# Patient Record
Sex: FEMALE | Race: WHITE | NOT HISPANIC OR LATINO | Employment: UNEMPLOYED | URBAN - METROPOLITAN AREA
[De-identification: names, ages, dates, MRNs, and addresses within clinical notes are randomized per-mention and may not be internally consistent; named-entity substitution may affect disease eponyms.]

---

## 2017-12-29 ENCOUNTER — HOSPITAL ENCOUNTER (OUTPATIENT)
Dept: OTHER | Facility: HOSPITAL | Age: 32
Setting detail: SPECIMEN
Discharge: HOME | End: 2017-12-29
Attending: OBSTETRICS & GYNECOLOGY | Admitting: OBSTETRICS & GYNECOLOGY

## 2019-09-26 ENCOUNTER — OFFICE (AMBULATORY)
Dept: URBAN - METROPOLITAN AREA CLINIC 64 | Facility: CLINIC | Age: 34
End: 2019-09-26

## 2019-09-26 VITALS
HEART RATE: 69 BPM | WEIGHT: 121 LBS | SYSTOLIC BLOOD PRESSURE: 122 MMHG | HEIGHT: 62 IN | DIASTOLIC BLOOD PRESSURE: 80 MMHG

## 2019-09-26 DIAGNOSIS — K50.111 CROHN'S DISEASE OF LARGE INTESTINE WITH RECTAL BLEEDING: ICD-10-CM

## 2019-09-26 PROCEDURE — 99203 OFFICE O/P NEW LOW 30 MIN: CPT | Performed by: INTERNAL MEDICINE

## 2019-11-07 ENCOUNTER — ON CAMPUS - OUTPATIENT (AMBULATORY)
Dept: URBAN - METROPOLITAN AREA HOSPITAL 2 | Facility: HOSPITAL | Age: 34
End: 2019-11-07

## 2019-11-07 ENCOUNTER — OFFICE (AMBULATORY)
Dept: URBAN - METROPOLITAN AREA PATHOLOGY 4 | Facility: PATHOLOGY | Age: 34
End: 2019-11-07

## 2019-11-07 VITALS
HEART RATE: 86 BPM | HEART RATE: 88 BPM | DIASTOLIC BLOOD PRESSURE: 84 MMHG | DIASTOLIC BLOOD PRESSURE: 73 MMHG | HEART RATE: 78 BPM | SYSTOLIC BLOOD PRESSURE: 114 MMHG | HEART RATE: 79 BPM | RESPIRATION RATE: 16 BRPM | SYSTOLIC BLOOD PRESSURE: 126 MMHG | OXYGEN SATURATION: 100 % | DIASTOLIC BLOOD PRESSURE: 44 MMHG | HEART RATE: 93 BPM | WEIGHT: 120 LBS | DIASTOLIC BLOOD PRESSURE: 63 MMHG | SYSTOLIC BLOOD PRESSURE: 103 MMHG | SYSTOLIC BLOOD PRESSURE: 117 MMHG | DIASTOLIC BLOOD PRESSURE: 62 MMHG | HEART RATE: 51 BPM | DIASTOLIC BLOOD PRESSURE: 70 MMHG | SYSTOLIC BLOOD PRESSURE: 97 MMHG | HEART RATE: 81 BPM | RESPIRATION RATE: 18 BRPM | DIASTOLIC BLOOD PRESSURE: 58 MMHG | OXYGEN SATURATION: 99 % | SYSTOLIC BLOOD PRESSURE: 104 MMHG | SYSTOLIC BLOOD PRESSURE: 93 MMHG | TEMPERATURE: 98.3 F | SYSTOLIC BLOOD PRESSURE: 98 MMHG | HEIGHT: 62 IN

## 2019-11-07 DIAGNOSIS — K58.9 IRRITABLE BOWEL SYNDROME WITHOUT DIARRHEA: ICD-10-CM

## 2019-11-07 DIAGNOSIS — K51.90 ULCERATIVE COLITIS, UNSPECIFIED, WITHOUT COMPLICATIONS: ICD-10-CM

## 2019-11-07 DIAGNOSIS — K63.5 POLYP OF COLON: ICD-10-CM

## 2019-11-07 DIAGNOSIS — K63.3 ULCER OF INTESTINE: ICD-10-CM

## 2019-11-07 PROBLEM — D12.3 BENIGN NEOPLASM OF TRANSVERSE COLON: Status: ACTIVE | Noted: 2019-11-07

## 2019-11-07 LAB
GI HISTOLOGY: A. SELECT: (no result)
GI HISTOLOGY: B. SELECT: (no result)
GI HISTOLOGY: C. UNSPECIFIED: (no result)
GI HISTOLOGY: D. SELECT: (no result)
GI HISTOLOGY: E. SELECT: (no result)
GI HISTOLOGY: PDF REPORT: (no result)

## 2019-11-07 PROCEDURE — 88305 TISSUE EXAM BY PATHOLOGIST: CPT | Performed by: INTERNAL MEDICINE

## 2019-11-07 PROCEDURE — 45380 COLONOSCOPY AND BIOPSY: CPT | Performed by: INTERNAL MEDICINE

## 2019-11-07 RX ORDER — MESALAMINE 1.2 G/1
2.4 TABLET, DELAYED RELEASE ORAL
Qty: 180 | Refills: 3 | Status: ACTIVE
Start: 2019-11-07

## 2019-11-07 RX ORDER — MESALAMINE 1000 MG/1
1 SUPPOSITORY RECTAL
Qty: 21 | Refills: 3 | Status: ACTIVE
Start: 2019-11-07

## 2021-10-01 ENCOUNTER — APPOINTMENT (OUTPATIENT)
Dept: GENERAL RADIOLOGY | Facility: HOSPITAL | Age: 36
End: 2021-10-01

## 2021-10-01 ENCOUNTER — APPOINTMENT (OUTPATIENT)
Dept: CT IMAGING | Facility: HOSPITAL | Age: 36
End: 2021-10-01

## 2021-10-01 ENCOUNTER — HOSPITAL ENCOUNTER (OUTPATIENT)
Facility: HOSPITAL | Age: 36
Setting detail: OBSERVATION
Discharge: HOME | End: 2021-10-02
Attending: EMERGENCY MEDICINE | Admitting: EMERGENCY MEDICINE

## 2021-10-01 DIAGNOSIS — R07.89 CHEST PRESSURE: Primary | ICD-10-CM

## 2021-10-01 LAB
ALBUMIN SERPL-MCNC: 4.3 G/DL (ref 3.5–5.2)
ALBUMIN/GLOB SERPL: 1.6 G/DL
ALP SERPL-CCNC: 45 U/L (ref 39–117)
ALT SERPL W P-5'-P-CCNC: 11 U/L (ref 1–33)
ANION GAP SERPL CALCULATED.3IONS-SCNC: 12 MMOL/L (ref 5–15)
APTT PPP: 27.7 SECONDS (ref 24–31)
AST SERPL-CCNC: 12 U/L (ref 1–32)
B-HCG UR QL: NEGATIVE
BASOPHILS # BLD AUTO: 0.1 10*3/MM3 (ref 0–0.2)
BASOPHILS NFR BLD AUTO: 0.7 % (ref 0–1.5)
BILIRUB SERPL-MCNC: 0.4 MG/DL (ref 0–1.2)
BUN SERPL-MCNC: 15 MG/DL (ref 6–20)
BUN/CREAT SERPL: 20 (ref 7–25)
CALCIUM SPEC-SCNC: 8.7 MG/DL (ref 8.6–10.5)
CHLORIDE SERPL-SCNC: 103 MMOL/L (ref 98–107)
CO2 SERPL-SCNC: 22 MMOL/L (ref 22–29)
CREAT SERPL-MCNC: 0.75 MG/DL (ref 0.57–1)
D DIMER PPP FEU-MCNC: 0.95 MG/L (FEU) (ref 0–0.59)
DEPRECATED RDW RBC AUTO: 41.6 FL (ref 37–54)
EOSINOPHIL # BLD AUTO: 0.1 10*3/MM3 (ref 0–0.4)
EOSINOPHIL NFR BLD AUTO: 0.9 % (ref 0.3–6.2)
ERYTHROCYTE [DISTWIDTH] IN BLOOD BY AUTOMATED COUNT: 13.3 % (ref 12.3–15.4)
GFR SERPL CREATININE-BSD FRML MDRD: 87 ML/MIN/1.73
GLOBULIN UR ELPH-MCNC: 2.7 GM/DL
GLUCOSE SERPL-MCNC: 98 MG/DL (ref 65–99)
HCT VFR BLD AUTO: 39.7 % (ref 34–46.6)
HGB BLD-MCNC: 13.7 G/DL (ref 12–15.9)
HOLD SPECIMEN: NORMAL
HOLD SPECIMEN: NORMAL
INR PPP: 1.02 (ref 0.93–1.1)
LYMPHOCYTES # BLD AUTO: 2 10*3/MM3 (ref 0.7–3.1)
LYMPHOCYTES NFR BLD AUTO: 21.5 % (ref 19.6–45.3)
MAGNESIUM SERPL-MCNC: 1.9 MG/DL (ref 1.6–2.6)
MCH RBC QN AUTO: 30.3 PG (ref 26.6–33)
MCHC RBC AUTO-ENTMCNC: 34.5 G/DL (ref 31.5–35.7)
MCV RBC AUTO: 88 FL (ref 79–97)
MONOCYTES # BLD AUTO: 0.6 10*3/MM3 (ref 0.1–0.9)
MONOCYTES NFR BLD AUTO: 6.2 % (ref 5–12)
NEUTROPHILS NFR BLD AUTO: 6.5 10*3/MM3 (ref 1.7–7)
NEUTROPHILS NFR BLD AUTO: 70.7 % (ref 42.7–76)
NRBC BLD AUTO-RTO: 0 /100 WBC (ref 0–0.2)
PLATELET # BLD AUTO: 268 10*3/MM3 (ref 140–450)
PMV BLD AUTO: 8 FL (ref 6–12)
POTASSIUM SERPL-SCNC: 4 MMOL/L (ref 3.5–5.2)
PROT SERPL-MCNC: 7 G/DL (ref 6–8.5)
PROTHROMBIN TIME: 11.3 SECONDS (ref 9.6–11.7)
RBC # BLD AUTO: 4.51 10*6/MM3 (ref 3.77–5.28)
SARS-COV-2 RNA PNL SPEC NAA+PROBE: NOT DETECTED
SODIUM SERPL-SCNC: 137 MMOL/L (ref 136–145)
TROPONIN T SERPL-MCNC: <0.01 NG/ML (ref 0–0.03)
TROPONIN T SERPL-MCNC: <0.01 NG/ML (ref 0–0.03)
TSH SERPL DL<=0.05 MIU/L-ACNC: 1.47 UIU/ML (ref 0.27–4.2)
WBC # BLD AUTO: 9.1 10*3/MM3 (ref 3.4–10.8)

## 2021-10-01 PROCEDURE — 93005 ELECTROCARDIOGRAM TRACING: CPT

## 2021-10-01 PROCEDURE — C9803 HOPD COVID-19 SPEC COLLECT: HCPCS

## 2021-10-01 PROCEDURE — G0378 HOSPITAL OBSERVATION PER HR: HCPCS

## 2021-10-01 PROCEDURE — 85610 PROTHROMBIN TIME: CPT | Performed by: NURSE PRACTITIONER

## 2021-10-01 PROCEDURE — 85730 THROMBOPLASTIN TIME PARTIAL: CPT | Performed by: NURSE PRACTITIONER

## 2021-10-01 PROCEDURE — 71045 X-RAY EXAM CHEST 1 VIEW: CPT

## 2021-10-01 PROCEDURE — 84443 ASSAY THYROID STIM HORMONE: CPT | Performed by: NURSE PRACTITIONER

## 2021-10-01 PROCEDURE — 81025 URINE PREGNANCY TEST: CPT | Performed by: NURSE PRACTITIONER

## 2021-10-01 PROCEDURE — 84484 ASSAY OF TROPONIN QUANT: CPT | Performed by: NURSE PRACTITIONER

## 2021-10-01 PROCEDURE — 96360 HYDRATION IV INFUSION INIT: CPT

## 2021-10-01 PROCEDURE — 87635 SARS-COV-2 COVID-19 AMP PRB: CPT | Performed by: EMERGENCY MEDICINE

## 2021-10-01 PROCEDURE — 83735 ASSAY OF MAGNESIUM: CPT | Performed by: NURSE PRACTITIONER

## 2021-10-01 PROCEDURE — 96361 HYDRATE IV INFUSION ADD-ON: CPT

## 2021-10-01 PROCEDURE — 96372 THER/PROPH/DIAG INJ SC/IM: CPT

## 2021-10-01 PROCEDURE — 85379 FIBRIN DEGRADATION QUANT: CPT | Performed by: NURSE PRACTITIONER

## 2021-10-01 PROCEDURE — 85025 COMPLETE CBC W/AUTO DIFF WBC: CPT | Performed by: NURSE PRACTITIONER

## 2021-10-01 PROCEDURE — 0 IOPAMIDOL PER 1 ML: Performed by: NURSE PRACTITIONER

## 2021-10-01 PROCEDURE — 80053 COMPREHEN METABOLIC PANEL: CPT | Performed by: NURSE PRACTITIONER

## 2021-10-01 PROCEDURE — 99284 EMERGENCY DEPT VISIT MOD MDM: CPT

## 2021-10-01 PROCEDURE — 93005 ELECTROCARDIOGRAM TRACING: CPT | Performed by: EMERGENCY MEDICINE

## 2021-10-01 PROCEDURE — 71275 CT ANGIOGRAPHY CHEST: CPT

## 2021-10-01 PROCEDURE — 25010000002 ENOXAPARIN PER 10 MG: Performed by: NURSE PRACTITIONER

## 2021-10-01 RX ORDER — ONDANSETRON 2 MG/ML
4 INJECTION INTRAMUSCULAR; INTRAVENOUS EVERY 6 HOURS PRN
Status: DISCONTINUED | OUTPATIENT
Start: 2021-10-01 | End: 2021-10-02 | Stop reason: HOSPADM

## 2021-10-01 RX ORDER — SODIUM CHLORIDE 0.9 % (FLUSH) 0.9 %
10 SYRINGE (ML) INJECTION EVERY 12 HOURS SCHEDULED
Status: DISCONTINUED | OUTPATIENT
Start: 2021-10-01 | End: 2021-10-02 | Stop reason: HOSPADM

## 2021-10-01 RX ORDER — SODIUM CHLORIDE 9 MG/ML
100 INJECTION, SOLUTION INTRAVENOUS CONTINUOUS
Status: DISCONTINUED | OUTPATIENT
Start: 2021-10-01 | End: 2021-10-02 | Stop reason: HOSPADM

## 2021-10-01 RX ORDER — SODIUM CHLORIDE 0.9 % (FLUSH) 0.9 %
10 SYRINGE (ML) INJECTION AS NEEDED
Status: DISCONTINUED | OUTPATIENT
Start: 2021-10-01 | End: 2021-10-02 | Stop reason: HOSPADM

## 2021-10-01 RX ORDER — NITROGLYCERIN 0.4 MG/1
0.4 TABLET SUBLINGUAL
Status: DISCONTINUED | OUTPATIENT
Start: 2021-10-01 | End: 2021-10-02 | Stop reason: HOSPADM

## 2021-10-01 RX ORDER — ASPIRIN 325 MG
325 TABLET ORAL ONCE
Status: COMPLETED | OUTPATIENT
Start: 2021-10-01 | End: 2021-10-01

## 2021-10-01 RX ORDER — ONDANSETRON 4 MG/1
4 TABLET, FILM COATED ORAL EVERY 6 HOURS PRN
Status: DISCONTINUED | OUTPATIENT
Start: 2021-10-01 | End: 2021-10-02 | Stop reason: HOSPADM

## 2021-10-01 RX ADMIN — IOPAMIDOL 100 ML: 755 INJECTION, SOLUTION INTRAVENOUS at 11:37

## 2021-10-01 RX ADMIN — ASPIRIN 325 MG ORAL TABLET 325 MG: 325 PILL ORAL at 10:43

## 2021-10-01 RX ADMIN — SODIUM CHLORIDE 100 ML/HR: 9 INJECTION, SOLUTION INTRAVENOUS at 20:02

## 2021-10-01 RX ADMIN — Medication 10 ML: at 15:42

## 2021-10-01 RX ADMIN — ENOXAPARIN SODIUM 40 MG: 40 INJECTION SUBCUTANEOUS at 15:42

## 2021-10-01 RX ADMIN — Medication 10 ML: at 20:03

## 2021-10-01 RX ADMIN — SODIUM CHLORIDE 100 ML/HR: 9 INJECTION, SOLUTION INTRAVENOUS at 15:42

## 2021-10-01 NOTE — ED PROVIDER NOTES
Subjective   Patient is a 36-year-old white female with no significant medical history presents today with complaints of left arm numbness tingling as well as pressure in the left side of the neck and jaw as well as some chest pressure.  She states she had an episode earlier in the week that started at rest and resolved after about 3 hours.  She states her symptoms returned last night after she lie down to go to bed and did not go away.  She does report that her chest pressure has improved but she continues to have pressure in the left side of her neck and jaw and a numb tingling feeling in her left arm.  She denies any alleviating or exacerbating factors.  She states she is attempted to change position and move her arm around but that does not alleviate her symptoms.  She states that at 1 point during these episodes she did feel hot and flushed.  She reports nausea and heartburn recently as well.  She denies vomiting.  She denies shortness of breath.  She denies any leg pain or swelling.  She denies any recent illness including fever chills cough or congestion.          Review of Systems   Constitutional: Negative for chills and fever.   HENT: Negative for congestion.    Respiratory: Negative for cough and shortness of breath.    Cardiovascular: Positive for chest pain and palpitations. Negative for leg swelling.   Gastrointestinal: Positive for nausea. Negative for abdominal pain, diarrhea and vomiting.        Heartburn   Genitourinary: Negative for difficulty urinating.   Musculoskeletal: Negative for back pain, neck pain and neck stiffness.        Pressure in the left side of the neck and jaw   Skin: Negative for rash.   Neurological: Positive for numbness. Negative for dizziness, speech difficulty, weakness, light-headedness and headaches.        Left arm numbness and tingling       No past medical history on file.    No Known Allergies    No past surgical history on file.    No family history on  file.    Social History     Socioeconomic History   • Marital status:      Spouse name: Not on file   • Number of children: Not on file   • Years of education: Not on file   • Highest education level: Not on file           Objective   Physical Exam  Vital signs and triage nurse note reviewed.  Constitutional: Awake, alert; well-developed and well-nourished. No acute distress is noted.  HEENT: Normocephalic, atraumatic; pupils are PERRL with intact EOM; oropharynx is pink and moist without exudate or erythema.  No drooling or pooling of oral secretions.  Neck: Supple, full range of motion without pain; no cervical lymphadenopathy. Normal phonation.  Cardiovascular: Mildly tachycardic rate and rhythm, normal S1-S2.  No murmur noted.  No reproducible pain.  No tenderness on palpation.  Pulmonary: Respiratory effort regular nonlabored, breath sounds clear to auscultation all fields.  Abdomen: Soft, nontender, nondistended with normoactive bowel sounds; no rebound or guarding.  Musculoskeletal: Independent range of motion of all extremities with no palpable tenderness or edema.  Calves are symmetric and nontender.  Neuro: Alert oriented x3, speech is clear and appropriate, GCS 15.    Skin: Flesh tone, warm, dry, intact; no erythematous or petechial rash or lesion.      Procedures           ED Course      Labs Reviewed   D-DIMER, QUANTITATIVE - Abnormal; Notable for the following components:       Result Value    D-Dimer, Quantitative 0.95 (*)     All other components within normal limits    Narrative:     Reference Range  --------------------------------------------------------------------     < 0.50   Negative Predictive Value  0.50-0.59   Indeterminate    >= 0.60   Probable VTE             A very low percentage of patients with DVT may yield D-Dimer results   below the cut-off of 0.50 mg/L FEU.  This is known to be more   prevalent in patients with distal DVT.             Results of this test should always be  interpreted in conjunction with   the patient's medical history, clinical presentation and other   findings.  Clinical diagnosis should not be based on the result of   INNOVANCE D-Dimer alone.   PROTIME-INR - Normal   APTT - Normal   PREGNANCY, URINE - Normal   TROPONIN (IN-HOUSE) - Normal    Narrative:     Troponin T Reference Range:  <= 0.03 ng/mL-   Negative for AMI  >0.03 ng/mL-     Abnormal for myocardial necrosis.  Clinicians would have to utilize clinical acumen, EKG, Troponin and serial changes to determine if it is an Acute Myocardial Infarction or myocardial injury due to an underlying chronic condition.       Results may be falsely decreased if patient taking Biotin.     MAGNESIUM - Normal   TSH - Normal   CBC WITH AUTO DIFFERENTIAL - Normal   COMPREHENSIVE METABOLIC PANEL    Narrative:     GFR Normal >60  Chronic Kidney Disease <60  Kidney Failure <15     CBC AND DIFFERENTIAL    Narrative:     The following orders were created for panel order CBC & Differential.  Procedure                               Abnormality         Status                     ---------                               -----------         ------                     CBC Auto Differential[[id number]]        Normal              Final result                 Please view results for these tests on the individual orders.   EXTRA TUBES    Narrative:     The following orders were created for panel order Extra Tubes.  Procedure                               Abnormality         Status                     ---------                               -----------         ------                     Gold Top - SST[[id number]]                                   Final result                 Please view results for these tests on the individual orders.   GOLD TOP - SST     XR Chest 1 View    Result Date: 10/1/2021  No acute cardiopulmonary abnormality is identified.  Electronically Signed By-Rachel Alaniz MD On:10/1/2021 10:59 AM This report was finalized on  [id number] by  Rachel Alaniz MD.    CT Chest Pulmonary Embolism    Result Date: 10/1/2021   1. The study is negative for pulmonary embolism. No acute intrathoracic abnormality. 2. Subsegmental atelectasis and/or scarring in the anterior left lung base. 3. Incompletely evaluated new or increased low-density lesions in the liver, statistically representing hepatic cysts and/or hemangiomas given the known history of malignancy. 4. Gastric wall thickening could be accentuated by underdistention but could reflect a nonspecific gastritis. 5. Partially imaged bilateral renal cysts, largest on the right.  Electronically Signed By-Renard Espinoza MD On:10/1/2021 11:47 AM This report was finalized on [id number] by  Renard Espinoza MD.    Medications   sodium chloride 0.9 % flush 10 mL (has no administration in time range)   aspirin tablet 325 mg (325 mg Oral Given 10/1/21 1043)   iopamidol (ISOVUE-370) 76 % injection 100 mL (100 mL Intravenous Given 10/1/21 1137)                                          MDM  Number of Diagnoses or Management Options  Chest pressure  Diagnosis management comments: Comorbidities: None  Differentials: Cardiac ischemia, ACS, anxiety, GERD, PE;this list is not all inclusive and does not constitute the entirety of considered causes  Discussion with provider:  Radiology interpretation: X-rays reviewed by me and interpreted by radiologist: As above  Lab interpretation: Labs viewed by me significant for: As above    Patient was placed on continuous cardiac monitor.  She had IV established.  She had labs, EKG and chest x-ray obtained.  She was given an aspirin.    Work-up: EKG reviewed by me and interpreted by Dr. Kirkland shows sinus rhythm with ventricular rate of 95.  No acute ST or T wave changes.  No ectopy.  CBC and metabolic panel are unremarkable.  Normal magnesium.  D-dimer 0.95.  Urine hCG negative.  Negative troponin.  TSH within normal limits.  Chest x-ray shows no acute abnormality.  CT  PE protocol shows negative for pulmonary embolism, no acute intrathoracic abnormality, subsegmental atelectasis, new or increased low-density lesions in the liver distinctively representing hepatic cysts or hemangiomas, gastric wall thickening could be accentuated by underdistention but could reflect a nonspecific gastritis, bilateral renal cysts.    Patient has a grossly unremarkable ED work-up today.  Her symptoms can very well be related to gastritis and/or GERD however cannot completely exclude cardiac etiology.  She will be admitted observation unit for stress testing and further treatment as warranted.  She was discussed with the nurse practitioner in the observation unit.    Diagnosis and treatment plan discussed with patient.  Patient agreeable to plan.          Amount and/or Complexity of Data Reviewed  Clinical lab tests: ordered and reviewed  Tests in the radiology section of CPT®: ordered and reviewed    Patient Progress  Patient progress: stable      Final diagnoses:   Chest pressure       ED Disposition  ED Disposition     ED Disposition Condition Comment    Decision to Admit            No follow-up provider specified.       Medication List      No changes were made to your prescriptions during this visit.          Kristie Prieto, VIRA  10/01/21 1152

## 2021-10-01 NOTE — PLAN OF CARE
Goal Outcome Evaluation:  Plan of Care Reviewed With: patient        Progress: improving  Outcome Summary: Pt is new admit from ED. No complaints of chest pain. Will continue to monitor her overnight. Stress test in the morning, will keep her NPO at midnight.

## 2021-10-01 NOTE — ED NOTES
Pt reports cx pressure that started Monday but went away but came on again last night around  reports it is in the middle of her chest and radiates to the left side of her neck that feels like pressure and numbness with some left arm tingling. Pt reports she had some nausea.     Roseann Casey, ELIZABET  10/01/21 5471

## 2021-10-01 NOTE — H&P
[facility] Observation Unit H&P    Patient Name: Parul Iyer  : 1985  MRN: [id number]  Primary Care Physician: Provider, No Known  Date of admission: 10/1/2021     Patient Care Team:  Provider, No Known as PCP - General          Subjective   History Present Illness     Chief Complaint:   Chief Complaint   Patient presents with   • Chest Pain         Ms. Iyer is a 36 y.o.  presents to [facility] complaining of chest pain.       36-year-old female presents to the ER with a chief complaint of chest pressure with associated left lateral arm numbness and tingling, squeezing to the left upper arm, and pressure in the left neck which is been intermittent over the last several days.  The patient had an episode on Monday causing her to seek medical care in an out of town emergency room.  However, treatment was delayed in the waiting room and after 2 hours she felt much better so she left hospital before being seen.  She had several episodes after that but they were short-lived and she did not seek medical attention.  However, Thursday evening 2021 the patient had recurrence of significant symptoms prompting her visit to the emergency room today.  The patient reports nausea most of the day Wednesday.    Patient reported family history includes a father having an MI in his early 50s; paternal grandfather having cardiac intervention in his 50s; maternal grandmother having cardiac bypass surgery x2.  The patient reports history of polycystic kidneys and her mom and her sister.        Review of Systems   Constitutional: Negative for chills, diaphoresis and fever.        Hot flash without diaphoresis   Cardiovascular: Positive for chest pain.   Respiratory: Negative for shortness of breath.    Gastrointestinal: Positive for nausea. Negative for vomiting.   Neurological: Positive for numbness and paresthesias.        Left lateral arm   All other systems reviewed and are negative.          Personal History      Past Medical History:   Past Medical History:   Diagnosis Date   • Crohn's disease (HCC)        Surgical History:    History reviewed. No pertinent surgical history.        Family History: family history includes Heart disease in her father, maternal grandmother, and paternal grandmother; Polycystic kidney disease in her mother and sister. Otherwise pertinent FHx was reviewed and unremarkable.     Social History:  reports that she has never smoked. She does not have any smokeless tobacco history on file. She reports previous alcohol use. She reports that she does not use drugs.      Medications: none      Allergies:  No Known Allergies    Objective   Objective     Vital Signs  Temp:  [97.5 °F (36.4 °C)-97.9 °F (36.6 °C)] 97.5 °F (36.4 °C)  Heart Rate:  [] 79  Resp:  [16-17] 16  BP: (102-139)/(67-87) 135/80  SpO2:  [98 %-100 %] 98 %  on   ;   Device (Oxygen Therapy): room air  Body mass index is 21.88 kg/m².    Physical Exam  Vitals and nursing note reviewed.   Constitutional:       Appearance: Normal appearance.   HENT:      Head: Normocephalic and atraumatic.      Right Ear: External ear normal.      Left Ear: External ear normal.      Nose: Nose normal.      Mouth/Throat:      Mouth: Mucous membranes are moist.   Eyes:      General: No scleral icterus.        Right eye: No discharge.         Left eye: No discharge.      Extraocular Movements: Extraocular movements intact.      Conjunctiva/sclera: Conjunctivae normal.      Pupils: Pupils are equal, round, and reactive to light.   Cardiovascular:      Rate and Rhythm: Normal rate and regular rhythm.      Pulses: Normal pulses.      Heart sounds: Normal heart sounds.   Pulmonary:      Effort: Pulmonary effort is normal.      Breath sounds: Normal breath sounds.   Abdominal:      General: Bowel sounds are normal.      Palpations: Abdomen is soft.   Musculoskeletal:         General: Normal range of motion.      Cervical back: Normal range of motion and neck  supple.      Right lower leg: No edema.      Left lower leg: No edema.   Skin:     General: Skin is warm and dry.      Capillary Refill: Capillary refill takes less than 2 seconds.   Neurological:      General: No focal deficit present.      Mental Status: She is alert and oriented to person, place, and time.   Psychiatric:         Mood and Affect: Mood normal.         Behavior: Behavior normal.         Thought Content: Thought content normal.         Judgment: Judgment normal.           Results Review:  I have personally reviewed most recent cardiac tracings, lab results and radiology images and interpretations and agree with findings.    Results from last 7 days   Lab Units 10/01/21  1043   WBC 10*3/mm3 9.10   HEMOGLOBIN g/dL 13.7   HEMATOCRIT % 39.7   PLATELETS 10*3/mm3 268   INR  1.02     Results from last 7 days   Lab Units 10/01/21  1336 10/01/21  1043   SODIUM mmol/L  --  137   POTASSIUM mmol/L  --  4.0   CHLORIDE mmol/L  --  103   CO2 mmol/L  --  22.0   BUN mg/dL  --  15   CREATININE mg/dL  --  0.75   GLUCOSE mg/dL  --  98   CALCIUM mg/dL  --  8.7   ALT (SGPT) U/L  --  11   AST (SGOT) U/L  --  12   TROPONIN T ng/mL <0.010 <0.010     Estimated Creatinine Clearance: 88 mL/min (by C-G formula based on SCr of 0.75 mg/dL).  Brief Urine Lab Results  (Last result in the past 365 days)      Color   Clarity   Blood   Leuk Est   Nitrite   Protein   CREAT   Urine HCG        10/01/21 1051               Negative           Microbiology Results (last 10 days)     Procedure Component Value - Date/Time    COVID PRE-OP / PRE-PROCEDURE SCREENING ORDER (NO ISOLATION) - Swab, Nasopharynx [[id number]]  (Normal) Collected: 10/01/21 1322    Lab Status: Final result Specimen: Swab from Nasopharynx Updated: 10/01/21 2827    Narrative:      The following orders were created for panel order COVID PRE-OP / PRE-PROCEDURE SCREENING ORDER (NO ISOLATION) - Swab, Nasopharynx.  Procedure                               Abnormality         Status                      ---------                               -----------         ------                     COVID-19,CEPHEID/PASCUAL/BD...[[id number]]  Normal              Final result                 Please view results for these tests on the individual orders.    COVID-19,CEPHEID/PASCUAL/BDMAX,COR/GUERO/PAD/DENISHA IN-HOUSE(OR EMERGENT/ADD-ON),NP SWAB IN TRANSPORT MEDIA 3-4 HR TAT, RT-PCR - Swab, Nasopharynx [[id number]]  (Normal) Collected: 10/01/21 1322    Lab Status: Final result Specimen: Swab from Nasopharynx Updated: 10/01/21 1407     COVID19 Not Detected    Narrative:      Fact sheet for providers: https://www.fda.gov/media/802200/download     Fact sheet for patients: https://www.fda.gov/media/420229/download  Fact sheet for providers: https://www.fda.gov/media/629662/download    Fact sheet for patients: https://www.fda.gov/media/602158/download    Test performed by PCR.          ECG/EMG Results (most recent)     Procedure Component Value Units Date/Time    ECG 12 Lead [[id number]] Collected: 10/01/21 1025     Updated: 10/01/21 1027     QT Interval 356 ms     Narrative:      HEART RATE= 95  bpm  RR Interval= 636  ms  [place] Interval= 134  ms  P Horizontal Axis= -5  deg  P Front Axis= 74  deg  QRSD Interval= 79  ms  QT Interval= 356  ms  QRS Axis= 68  deg  T Wave Axis= 64  deg  - NORMAL ECG -  Sinus rhythm  Electronically Signed By:   Date and Time of Study: 2021-10-01 10:25:25                  XR Chest 1 View    Result Date: 10/1/2021  No acute cardiopulmonary abnormality is identified.  Electronically Signed By-Rachel Alaniz MD On:10/1/2021 10:59 AM This report was finalized on [id number] by  Rachel Alaniz MD.    CT Chest Pulmonary Embolism    Result Date: 10/1/2021   1. The study is negative for pulmonary embolism. No acute intrathoracic abnormality. 2. Subsegmental atelectasis and/or scarring in the anterior left lung base. 3. Incompletely evaluated new or increased low-density lesions in the liver, statistically  representing hepatic cysts and/or hemangiomas given the known history of malignancy. 4. Gastric wall thickening could be accentuated by underdistention but could reflect a nonspecific gastritis. 5. Partially imaged bilateral renal cysts, largest on the right.  Electronically Signed By-Renard Espinoza MD On:10/1/2021 11:47 AM This report was finalized on [id number] by  Renard Espinoza MD.        Estimated Creatinine Clearance: 88 mL/min (by C-G formula based on SCr of 0.75 mg/dL).    Assessment/Plan   Assessment/Plan       Active Hospital Problems    Diagnosis  POA   • **Chest pressure [R07.89]  Yes     Priority: High      Resolved Hospital Problems   No resolved problems to display.     Chest pain, uncertain etiology--cardiac cause needs to be evaluated: Serial troponin; stress Myoview  -CT chest PE protocol negative, bilateral renal cyst noted       VTE Prophylaxis -   Mechanical Order History:     None      Pharmalogical Order History:      Ordered     Dose Route Frequency Stop    10/01/21 1230  enoxaparin (LOVENOX) syringe 40 mg      40 mg SC Daily --                CODE STATUS:    Code Status and Medical Interventions:   Ordered at: 10/01/21 1231     Code Status:    CPR     Medical Interventions (Level of Support Prior to Arrest):    Full       This patient has been examined wearing personal protective equipment.     I discussed the patient's findings and my recommendations with patient and family.      Signature:Electronically signed by VIRA Powell, 10/01/21, 2:43 PM EDT.

## 2021-10-02 ENCOUNTER — APPOINTMENT (OUTPATIENT)
Dept: NUCLEAR MEDICINE | Facility: HOSPITAL | Age: 36
End: 2021-10-02

## 2021-10-02 VITALS
OXYGEN SATURATION: 99 % | HEIGHT: 62 IN | DIASTOLIC BLOOD PRESSURE: 81 MMHG | HEART RATE: 76 BPM | SYSTOLIC BLOOD PRESSURE: 114 MMHG | RESPIRATION RATE: 16 BRPM | BODY MASS INDEX: 22.01 KG/M2 | WEIGHT: 119.6 LBS | TEMPERATURE: 97.8 F

## 2021-10-02 PROBLEM — M54.12 CERVICAL RADICULOPATHY: Status: ACTIVE | Noted: 2021-10-02

## 2021-10-02 PROBLEM — R07.89 CHEST PRESSURE: Status: RESOLVED | Noted: 2021-10-01 | Resolved: 2021-10-02

## 2021-10-02 LAB
BH CV NUCLEAR PRIOR STUDY: 3
BH CV REST NUCLEAR ISOTOPE DOSE: 7.9 MCI
BH CV STRESS BP STAGE 1: NORMAL
BH CV STRESS BP STAGE 2: NORMAL
BH CV STRESS BP STAGE 3: NORMAL
BH CV STRESS BP STAGE 4: NORMAL
BH CV STRESS DURATION MIN STAGE 1: 3
BH CV STRESS DURATION MIN STAGE 2: 3
BH CV STRESS DURATION MIN STAGE 3: 3
BH CV STRESS DURATION MIN STAGE 4: 3
BH CV STRESS DURATION SEC STAGE 1: 0
BH CV STRESS DURATION SEC STAGE 2: 0
BH CV STRESS DURATION SEC STAGE 3: 0
BH CV STRESS DURATION SEC STAGE 4: 0
BH CV STRESS GRADE STAGE 1: 10
BH CV STRESS GRADE STAGE 2: 12
BH CV STRESS GRADE STAGE 3: 14
BH CV STRESS GRADE STAGE 4: 16
BH CV STRESS HR STAGE 1: 124
BH CV STRESS HR STAGE 2: 140
BH CV STRESS HR STAGE 3: 154
BH CV STRESS HR STAGE 4: 162
BH CV STRESS METS STAGE 1: 5
BH CV STRESS METS STAGE 2: 7.5
BH CV STRESS METS STAGE 3: 10
BH CV STRESS METS STAGE 4: 13.5
BH CV STRESS NUCLEAR ISOTOPE DOSE: 21.8 MCI
BH CV STRESS PROTOCOL 1: NORMAL
BH CV STRESS RECOVERY BP: NORMAL MMHG
BH CV STRESS RECOVERY HR: 98 BPM
BH CV STRESS SPEED STAGE 1: 1.7
BH CV STRESS SPEED STAGE 2: 2.5
BH CV STRESS SPEED STAGE 3: 3.4
BH CV STRESS SPEED STAGE 4: 4.2
BH CV STRESS STAGE 1: 1
BH CV STRESS STAGE 2: 2
BH CV STRESS STAGE 3: 3
BH CV STRESS STAGE 4: 4
MAXIMAL PREDICTED HEART RATE: 183 BPM
PERCENT MAX PREDICTED HR: 88.52 %
STRESS BASELINE BP: NORMAL MMHG
STRESS BASELINE HR: 99 BPM
STRESS PERCENT HR: 104 %
STRESS POST PEAK BP: NORMAL MMHG
STRESS POST PEAK HR: 162 BPM
STRESS TARGET HR: 156 BPM

## 2021-10-02 PROCEDURE — G0378 HOSPITAL OBSERVATION PER HR: HCPCS

## 2021-10-02 PROCEDURE — 78452 HT MUSCLE IMAGE SPECT MULT: CPT

## 2021-10-02 PROCEDURE — 93018 CV STRESS TEST I&R ONLY: CPT | Performed by: INTERNAL MEDICINE

## 2021-10-02 PROCEDURE — 93016 CV STRESS TEST SUPVJ ONLY: CPT | Performed by: INTERNAL MEDICINE

## 2021-10-02 PROCEDURE — 0 TECHNETIUM TETROFOSMIN KIT: Performed by: EMERGENCY MEDICINE

## 2021-10-02 PROCEDURE — A9502 TC99M TETROFOSMIN: HCPCS | Performed by: EMERGENCY MEDICINE

## 2021-10-02 PROCEDURE — 96361 HYDRATE IV INFUSION ADD-ON: CPT

## 2021-10-02 PROCEDURE — 93017 CV STRESS TEST TRACING ONLY: CPT

## 2021-10-02 PROCEDURE — 78452 HT MUSCLE IMAGE SPECT MULT: CPT | Performed by: INTERNAL MEDICINE

## 2021-10-02 RX ADMIN — TETROFOSMIN 1 DOSE: 1.38 INJECTION, POWDER, LYOPHILIZED, FOR SOLUTION INTRAVENOUS at 09:00

## 2021-10-02 RX ADMIN — TETROFOSMIN 1 DOSE: 1.38 INJECTION, POWDER, LYOPHILIZED, FOR SOLUTION INTRAVENOUS at 07:15

## 2021-10-02 RX ADMIN — SODIUM CHLORIDE 100 ML/HR: 9 INJECTION, SOLUTION INTRAVENOUS at 05:04

## 2021-10-02 NOTE — PLAN OF CARE
Problem: Adult Inpatient Plan of Care  Goal: Plan of Care Review  Outcome: Ongoing, Progressing  Flowsheets (Taken 10/1/2021 2346)  Plan of Care Reviewed With: patient  Outcome Summary: HAVING ABD PAIN. GI TO SEE IN AM  Goal: Patient-Specific Goal (Individualized)  Outcome: Ongoing, Progressing  Goal: Absence of Hospital-Acquired Illness or Injury  Outcome: Ongoing, Progressing  Intervention: Identify and Manage Fall Risk  Recent Flowsheet Documentation  Taken 10/1/2021 2300 by Elena Rose RN  Safety Promotion/Fall Prevention:   safety round/check completed   nonskid shoes/slippers when out of bed   lighting adjusted  Goal: Optimal Comfort and Wellbeing  Outcome: Ongoing, Progressing  Goal: Readiness for Transition of Care  Outcome: Ongoing, Progressing   Goal Outcome Evaluation:  Plan of Care Reviewed With: patient           Outcome Summary: HAVING ABD PAIN. GI TO SEE IN AM

## 2021-10-02 NOTE — DISCHARGE SUMMARY
[facility]  DISCHARGE SUMMARY        Prepared For PCP:  Provider, No Known    Patient Name: Parul Iyer  : 1985  MRN: [id number]      Date of Admission:   10/1/2021    Date of Discharge:  10/2/2021    Length of stay:  LOS: 0 days     Hospital Course     Presenting Problem:   Chest pressure [R07.89]      Active Hospital Problems    Diagnosis  POA   • Cervical radiculopathy [M54.12]  Unknown      Resolved Hospital Problems    Diagnosis Date Resolved POA   • **Chest pressure [R07.89] 10/02/2021 Yes     Priority: High           Hospital Course:  Parul Iyer is a 36 y.o. female presented to the ER with a chief complaint of chest pain with radiation down the left arm and into the left neck.  The patient had positive D-dimer and recent travel with subsequent CT chest PE protocol negative for pulmonary embolism.  The patient had acute MI ruled out by negative troponin x2.  The patient had a stress Myoview which was was consistent with a low risk study.  Patient's symptoms are consistent with cervical radiculopathy.  The patient has not seen her PCP in approximately 5 years.  Recommend establishment and follow-up with PCP.  Patient may take NSAIDs over-the-counter for pain.  Cyst were seen on the bilateral kidneys with family history of polycystic kidney and 2 first-degree relatives.  Patient renal function is normal.        Recommendation for Outpatient Providers:             Reasons For Change In Medications and Indications for New Medications:        Day of Discharge     HPI:   36-year-old female presents to the ER with a chief complaint of chest pressure with associated left lateral arm numbness and tingling, squeezing to the left upper arm, and pressure in the left neck which is been intermittent over the last several days.  The patient had an episode on Monday causing her to seek medical care in an out of town emergency room.  However, treatment was delayed in the waiting room and after  2 hours she felt much better so she left hospital before being seen.  She had several episodes after that but they were short-lived and she did not seek medical attention.  However, Thursday evening 9/30/2021 the patient had recurrence of significant symptoms prompting her visit to the emergency room today.  The patient reports nausea most of the day Wednesday.     Patient reported family history includes a father having an MI in his early 50s; paternal grandfather having cardiac intervention in his 50s; maternal grandmother having cardiac bypass surgery x2.  The patient reports history of polycystic kidneys and her mom and her sister.    Vital Signs:   Temp:  [97.5 °F (36.4 °C)-98.5 °F (36.9 °C)] 97.8 °F (36.6 °C)  Heart Rate:  [76-88] 76  Resp:  [16] 16  BP: (112-135)/(66-81) 114/81     ROS:  Review of Systems   All other systems reviewed and are negative.      Physical Exam:  Physical Exam  Vitals and nursing note reviewed.   Constitutional:       Appearance: Normal appearance.   HENT:      Head: Normocephalic and atraumatic.      Right Ear: External ear normal.      Left Ear: External ear normal.      Nose: Nose normal. No congestion or rhinorrhea.      Mouth/Throat:      Mouth: Mucous membranes are moist.   Eyes:      General: No scleral icterus.        Right eye: No discharge.         Left eye: No discharge.      Extraocular Movements: Extraocular movements intact.      Conjunctiva/sclera: Conjunctivae normal.      Pupils: Pupils are equal, round, and reactive to light.   Cardiovascular:      Rate and Rhythm: Normal rate and regular rhythm.      Pulses: Normal pulses.      Heart sounds: No murmur heard.     Pulmonary:      Effort: Pulmonary effort is normal.      Breath sounds: Normal breath sounds.   Chest:      Chest wall: No tenderness.   Abdominal:      General: Bowel sounds are normal.      Palpations: Abdomen is soft.   Musculoskeletal:         General: Normal range of motion.      Cervical back: Normal  range of motion and neck supple.      Right lower leg: No edema.      Left lower leg: No edema.   Skin:     General: Skin is warm and dry.      Capillary Refill: Capillary refill takes less than 2 seconds.   Neurological:      General: No focal deficit present.      Mental Status: She is alert and oriented to person, place, and time.   Psychiatric:         Mood and Affect: Mood normal.         Behavior: Behavior normal.         Thought Content: Thought content normal.         Judgment: Judgment normal.         Pertinent  and/or Most Recent Results     Results from last 7 days   Lab Units 10/01/21  1043   WBC 10*3/mm3 9.10   HEMOGLOBIN g/dL 13.7   HEMATOCRIT % 39.7   PLATELETS 10*3/mm3 268   SODIUM mmol/L 137   POTASSIUM mmol/L 4.0   CHLORIDE mmol/L 103   CO2 mmol/L 22.0   BUN mg/dL 15   CREATININE mg/dL 0.75   GLUCOSE mg/dL 98   CALCIUM mg/dL 8.7     Results from last 7 days   Lab Units 10/01/21  1043   BILIRUBIN mg/dL 0.4   ALK PHOS U/L 45   ALT (SGPT) U/L 11   AST (SGOT) U/L 12   PROTIME Seconds 11.3   INR  1.02   APTT seconds 27.7           Invalid input(s): TG, LDLCALC, LDLREALC  Results from last 7 days   Lab Units 10/01/21  1336 10/01/21  1043   TSH uIU/mL  --  1.470   TROPONIN T ng/mL <0.010 <0.010       Brief Urine Lab Results  (Last result in the past 365 days)      Color   Clarity   Blood   Leuk Est   Nitrite   Protein   CREAT   Urine HCG        10/01/21 1051               Negative           Microbiology Results Abnormal     Procedure Component Value - Date/Time    COVID PRE-OP / PRE-PROCEDURE SCREENING ORDER (NO ISOLATION) - Swab, Nasopharynx [[id number]]  (Normal) Collected: 10/01/21 1322    Lab Status: Final result Specimen: Swab from Nasopharynx Updated: 10/01/21 6177    Narrative:      The following orders were created for panel order COVID PRE-OP / PRE-PROCEDURE SCREENING ORDER (NO ISOLATION) - Swab, Nasopharynx.  Procedure                               Abnormality         Status                      ---------                               -----------         ------                     COVID-19,CEPHEID/PASCUAL/BD...[[id number]]  Normal              Final result                 Please view results for these tests on the individual orders.    COVID-19,CEPHEID/PASCUAL/BDMAX,COR/GUERO/PAD/DENISHA IN-HOUSE(OR EMERGENT/ADD-ON),NP SWAB IN TRANSPORT MEDIA 3-4 HR TAT, RT-PCR - Swab, Nasopharynx [[id number]]  (Normal) Collected: 10/01/21 1322    Lab Status: Final result Specimen: Swab from Nasopharynx Updated: 10/01/21 1407     COVID19 Not Detected    Narrative:      Fact sheet for providers: https://www.fda.gov/media/104492/download     Fact sheet for patients: https://www.fda.gov/media/770122/download  Fact sheet for providers: https://www.fda.gov/media/644397/download    Fact sheet for patients: https://www.fda.gov/media/491477/download    Test performed by PCR.          XR Chest 1 View    Result Date: 10/1/2021  Impression: No acute cardiopulmonary abnormality is identified.  Electronically Signed By-Rachel Alaniz MD On:10/1/2021 10:59 AM This report was finalized on [id number] by  Rachel Alaniz MD.    CT Chest Pulmonary Embolism    Result Date: 10/1/2021  Impression:  1. The study is negative for pulmonary embolism. No acute intrathoracic abnormality. 2. Subsegmental atelectasis and/or scarring in the anterior left lung base. 3. Incompletely evaluated new or increased low-density lesions in the liver, statistically representing hepatic cysts and/or hemangiomas given the known history of malignancy. 4. Gastric wall thickening could be accentuated by underdistention but could reflect a nonspecific gastritis. 5. Partially imaged bilateral renal cysts, largest on the right.  Electronically Signed By-Renard Espinoza MD On:10/1/2021 11:47 AM This report was finalized on [id number] by  Renard Espinoza MD.                          Test Results Pending at Discharge        Procedures Performed           Consults:   Consults     No  orders found for last 30 day(s).            Discharge Details        Discharge Medications      Patient Not Prescribed Medications Upon Discharge         Allergies   Allergen Reactions   • Gluten Meal GI Intolerance         Discharge Disposition:      Diet:  Hospital:  Diet Order   Procedures   • Diet Cardiac, Gluten Sensitive; Healthy Heart         Discharge Activity: As tolerated        CODE STATUS:    Code Status and Medical Interventions:   Ordered at: 10/01/21 1231     Code Status:    CPR     Medical Interventions (Level of Support Prior to Arrest):    Full         Follow-up Appointments  No future appointments.          Condition on Discharge:      Stable      This patient has been examined wearing appropriate Personal Protective Equipment. 10/02/21      Electronically signed by VIRA Powell, 10/02/21, 2:07 PM EDT.      Time: I spent  60  minutes on this discharge activity which included face-to-face encounter with the patient/reviewing the data in the system/coordination of the care with the nursing staff as well as consultants/documentation/entering orders.

## 2021-10-07 LAB — QT INTERVAL: 356 MS

## 2022-06-07 PROCEDURE — 93005 ELECTROCARDIOGRAM TRACING: CPT | Performed by: EMERGENCY MEDICINE

## 2022-06-07 PROCEDURE — 99283 EMERGENCY DEPT VISIT LOW MDM: CPT

## 2022-06-07 PROCEDURE — 93005 ELECTROCARDIOGRAM TRACING: CPT

## 2022-06-08 ENCOUNTER — APPOINTMENT (OUTPATIENT)
Dept: GENERAL RADIOLOGY | Facility: HOSPITAL | Age: 37
End: 2022-06-08

## 2022-06-08 ENCOUNTER — APPOINTMENT (OUTPATIENT)
Dept: RESPIRATORY THERAPY | Facility: HOSPITAL | Age: 37
End: 2022-06-08

## 2022-06-08 ENCOUNTER — HOSPITAL ENCOUNTER (EMERGENCY)
Facility: HOSPITAL | Age: 37
Discharge: HOME | End: 2022-06-08
Attending: EMERGENCY MEDICINE | Admitting: EMERGENCY MEDICINE

## 2022-06-08 VITALS
HEART RATE: 66 BPM | HEIGHT: 62 IN | WEIGHT: 118.39 LBS | TEMPERATURE: 97.6 F | BODY MASS INDEX: 21.79 KG/M2 | RESPIRATION RATE: 16 BRPM | OXYGEN SATURATION: 95 % | DIASTOLIC BLOOD PRESSURE: 85 MMHG | SYSTOLIC BLOOD PRESSURE: 110 MMHG

## 2022-06-08 DIAGNOSIS — R00.2 PALPITATIONS: ICD-10-CM

## 2022-06-08 DIAGNOSIS — R07.9 CHEST PAIN, UNSPECIFIED TYPE: Primary | ICD-10-CM

## 2022-06-08 LAB
ANION GAP SERPL CALCULATED.3IONS-SCNC: 12 MMOL/L (ref 5–15)
BASOPHILS # BLD AUTO: 0 10*3/MM3 (ref 0–0.2)
BASOPHILS NFR BLD AUTO: 0.5 % (ref 0–1.5)
BILIRUB UR QL STRIP: NEGATIVE
BUN SERPL-MCNC: 18 MG/DL (ref 6–20)
BUN/CREAT SERPL: 22.2 (ref 7–25)
CALCIUM SPEC-SCNC: 9.7 MG/DL (ref 8.6–10.5)
CHLORIDE SERPL-SCNC: 99 MMOL/L (ref 98–107)
CLARITY UR: CLEAR
CO2 SERPL-SCNC: 25 MMOL/L (ref 22–29)
COLOR UR: YELLOW
CREAT SERPL-MCNC: 0.81 MG/DL (ref 0.57–1)
DEPRECATED RDW RBC AUTO: 40.7 FL (ref 37–54)
EGFRCR SERPLBLD CKD-EPI 2021: 96 ML/MIN/1.73
EOSINOPHIL # BLD AUTO: 0.2 10*3/MM3 (ref 0–0.4)
EOSINOPHIL NFR BLD AUTO: 1.6 % (ref 0.3–6.2)
ERYTHROCYTE [DISTWIDTH] IN BLOOD BY AUTOMATED COUNT: 13 % (ref 12.3–15.4)
GLUCOSE SERPL-MCNC: 97 MG/DL (ref 65–99)
GLUCOSE UR STRIP-MCNC: NEGATIVE MG/DL
HCT VFR BLD AUTO: 43.1 % (ref 34–46.6)
HGB BLD-MCNC: 14.4 G/DL (ref 12–15.9)
HGB UR QL STRIP.AUTO: NEGATIVE
KETONES UR QL STRIP: ABNORMAL
LEUKOCYTE ESTERASE UR QL STRIP.AUTO: NEGATIVE
LYMPHOCYTES # BLD AUTO: 3.9 10*3/MM3 (ref 0.7–3.1)
LYMPHOCYTES NFR BLD AUTO: 40.5 % (ref 19.6–45.3)
MCH RBC QN AUTO: 29.8 PG (ref 26.6–33)
MCHC RBC AUTO-ENTMCNC: 33.3 G/DL (ref 31.5–35.7)
MCV RBC AUTO: 89.6 FL (ref 79–97)
MONOCYTES # BLD AUTO: 0.7 10*3/MM3 (ref 0.1–0.9)
MONOCYTES NFR BLD AUTO: 7.3 % (ref 5–12)
NEUTROPHILS NFR BLD AUTO: 4.8 10*3/MM3 (ref 1.7–7)
NEUTROPHILS NFR BLD AUTO: 50.1 % (ref 42.7–76)
NITRITE UR QL STRIP: NEGATIVE
NRBC BLD AUTO-RTO: 0 /100 WBC (ref 0–0.2)
PH UR STRIP.AUTO: 5.5 [PH] (ref 5–8)
PLATELET # BLD AUTO: 270 10*3/MM3 (ref 140–450)
PMV BLD AUTO: 7.9 FL (ref 6–12)
POTASSIUM SERPL-SCNC: 3.8 MMOL/L (ref 3.5–5.2)
PROT UR QL STRIP: NEGATIVE
RBC # BLD AUTO: 4.82 10*6/MM3 (ref 3.77–5.28)
SODIUM SERPL-SCNC: 136 MMOL/L (ref 136–145)
SP GR UR STRIP: 1.02 (ref 1–1.03)
TSH SERPL DL<=0.05 MIU/L-ACNC: 2.09 UIU/ML (ref 0.27–4.2)
UROBILINOGEN UR QL STRIP: ABNORMAL
WBC NRBC COR # BLD: 9.6 10*3/MM3 (ref 3.4–10.8)

## 2022-06-08 PROCEDURE — 93225 XTRNL ECG REC<48 HRS REC: CPT

## 2022-06-08 PROCEDURE — 80048 BASIC METABOLIC PNL TOTAL CA: CPT | Performed by: EMERGENCY MEDICINE

## 2022-06-08 PROCEDURE — 81003 URINALYSIS AUTO W/O SCOPE: CPT | Performed by: EMERGENCY MEDICINE

## 2022-06-08 PROCEDURE — 85025 COMPLETE CBC W/AUTO DIFF WBC: CPT | Performed by: EMERGENCY MEDICINE

## 2022-06-08 PROCEDURE — 71045 X-RAY EXAM CHEST 1 VIEW: CPT

## 2022-06-08 PROCEDURE — 84443 ASSAY THYROID STIM HORMONE: CPT | Performed by: EMERGENCY MEDICINE

## 2022-06-08 PROCEDURE — 93005 ELECTROCARDIOGRAM TRACING: CPT | Performed by: EMERGENCY MEDICINE

## 2022-06-08 RX ORDER — SODIUM CHLORIDE 0.9 % (FLUSH) 0.9 %
10 SYRINGE (ML) INJECTION AS NEEDED
Status: DISCONTINUED | OUTPATIENT
Start: 2022-06-08 | End: 2022-06-08 | Stop reason: HOSPADM

## 2022-06-08 NOTE — ED PROVIDER NOTES
Subjective   Patient is a 36-year-old female complaint several day history of intermittent chest pain described as a burning sensation that last several minutes.  She states it radiates to her back her neck and her arms.  She also had some mild palpitations without dizziness or shortness of breath.  She has no complaints at this time.          Review of Systems   Constitutional: Negative for chills and fever.   HENT: Negative for congestion and sore throat.    Eyes: Negative.    Respiratory: Negative for cough, chest tightness and shortness of breath.    Cardiovascular: Positive for chest pain and palpitations.   Gastrointestinal: Negative for abdominal pain, diarrhea and vomiting.   Endocrine: Negative for polyuria.   Genitourinary: Negative for dysuria.   Musculoskeletal: Negative for back pain.   Neurological: Negative for dizziness and headaches.   A complete review of systems was obtained and is otherwise negative    Past Medical History:   Diagnosis Date   • Crohn's disease (HCC)        Allergies   Allergen Reactions   • Gluten Meal GI Intolerance       No past surgical history on file.    Family History   Problem Relation Age of Onset   • Polycystic kidney disease Mother    • Heart disease Father    • Polycystic kidney disease Sister    • Heart disease Maternal Grandmother    • Heart disease Paternal Grandmother        Social History     Socioeconomic History   • Marital status:    Tobacco Use   • Smoking status: Never Smoker   Substance and Sexual Activity   • Alcohol use: Not Currently   • Drug use: Never   • Sexual activity: Defer           Objective   Physical Exam  HEENT exam shows TMs to be clear.  Oropharynx comers but sclera is nonicteric.  Neck has no adenopathy JVD or bruits.  Lungs are clear.  Heart has a regular rate rhythm without murmur rub or gallop.  Chest is nontender.  Abdomen is soft nontender.  Patient has normal bowel sounds without rebound or guarding.  Back has no CVA tenderness.   Extremity exam is no cyanosis or edema.  Procedures     My EKG interpretation shows normal sinus rhythm with no acute ST change.  Patient has a heart rate of 70.      ED Course      Results for orders placed or performed during the hospital encounter of 06/08/22   Basic Metabolic Panel    Specimen: Blood   Result Value Ref Range    Glucose 97 65 - 99 mg/dL    BUN 18 6 - 20 mg/dL    Creatinine 0.81 0.57 - 1.00 mg/dL    Sodium 136 136 - 145 mmol/L    Potassium 3.8 3.5 - 5.2 mmol/L    Chloride 99 98 - 107 mmol/L    CO2 25.0 22.0 - 29.0 mmol/L    Calcium 9.7 8.6 - 10.5 mg/dL    BUN/Creatinine Ratio 22.2 7.0 - 25.0    Anion Gap 12.0 5.0 - 15.0 mmol/L    eGFR 96.0 >60.0 mL/min/1.73   Urinalysis With Microscopic If Indicated (No Culture) - Urine, Clean Catch    Specimen: Urine, Clean Catch   Result Value Ref Range    Color, UA Yellow Yellow, Straw    Appearance, UA Clear Clear    pH, UA 5.5 5.0 - 8.0    Specific Gravity, UA 1.024 1.005 - 1.030    Glucose, UA Negative Negative    Ketones, UA Trace (A) Negative    Bilirubin, UA Negative Negative    Blood, UA Negative Negative    Protein, UA Negative Negative    Leuk Esterase, UA Negative Negative    Nitrite, UA Negative Negative    Urobilinogen, UA 1.0 E.U./dL 0.2 - 1.0 E.U./dL   TSH    Specimen: Blood   Result Value Ref Range    TSH 2.090 0.270 - 4.200 uIU/mL   CBC Auto Differential    Specimen: Blood   Result Value Ref Range    WBC 9.60 3.40 - 10.80 10*3/mm3    RBC 4.82 3.77 - 5.28 10*6/mm3    Hemoglobin 14.4 12.0 - 15.9 g/dL    Hematocrit 43.1 34.0 - 46.6 %    MCV 89.6 79.0 - 97.0 fL    MCH 29.8 26.6 - 33.0 pg    MCHC 33.3 31.5 - 35.7 g/dL    RDW 13.0 12.3 - 15.4 %    RDW-SD 40.7 37.0 - 54.0 fl    MPV 7.9 6.0 - 12.0 fL    Platelets 270 140 - 450 10*3/mm3    Neutrophil % 50.1 42.7 - 76.0 %    Lymphocyte % 40.5 19.6 - 45.3 %    Monocyte % 7.3 5.0 - 12.0 %    Eosinophil % 1.6 0.3 - 6.2 %    Basophil % 0.5 0.0 - 1.5 %    Neutrophils, Absolute 4.80 1.70 - 7.00 10*3/mm3     Lymphocytes, Absolute 3.90 (H) 0.70 - 3.10 10*3/mm3    Monocytes, Absolute 0.70 0.10 - 0.90 10*3/mm3    Eosinophils, Absolute 0.20 0.00 - 0.40 10*3/mm3    Basophils, Absolute 0.00 0.00 - 0.20 10*3/mm3    nRBC 0.0 0.0 - 0.2 /100 WBC   ECG 12 Lead   Result Value Ref Range    QT Interval 359 ms   ECG 12 Lead   Result Value Ref Range    QT Interval 404 ms     No radiology results for the last day                                             MDM  Number of Diagnoses or Management Options  Diagnosis management comments: Chest x-ray interpretation shows no cardiomegaly fusion or infiltrate.  Metabolic panel is at baseline without renal insufficiency or electrolyte abnormality.  There is no evidence acute infectious process.  Patient was symptom-free throughout ED visit.  She will be discharged and will placed on point for Holter.  She will follow with MD for recheck.       Amount and/or Complexity of Data Reviewed  Clinical lab tests: reviewed  Tests in the medicine section of CPT®: reviewed    Risk of Complications, Morbidity, and/or Mortality  Presenting problems: high  Diagnostic procedures: high  Management options: high    Patient Progress  Patient progress: stable      Final diagnoses:   Chest pain, unspecified type   Palpitations       ED Disposition  ED Disposition     ED Disposition   Discharge    Condition   Stable    Comment   --             No follow-up provider specified.       Medication List      No changes were made to your prescriptions during this visit.          Wagner Thompson MD  06/08/22 0256

## 2022-06-09 ENCOUNTER — HOSPITAL ENCOUNTER (OUTPATIENT)
Facility: HOSPITAL | Age: 37
Setting detail: OBSERVATION
Discharge: HOME | End: 2022-06-10
Attending: EMERGENCY MEDICINE | Admitting: EMERGENCY MEDICINE

## 2022-06-09 DIAGNOSIS — R07.9 CHEST PAIN, UNSPECIFIED TYPE: ICD-10-CM

## 2022-06-09 DIAGNOSIS — R00.2 PALPITATIONS: Primary | ICD-10-CM

## 2022-06-09 PROCEDURE — 93005 ELECTROCARDIOGRAM TRACING: CPT | Performed by: EMERGENCY MEDICINE

## 2022-06-09 PROCEDURE — G0378 HOSPITAL OBSERVATION PER HR: HCPCS

## 2022-06-09 PROCEDURE — 93005 ELECTROCARDIOGRAM TRACING: CPT

## 2022-06-09 PROCEDURE — 99284 EMERGENCY DEPT VISIT MOD MDM: CPT

## 2022-06-10 ENCOUNTER — APPOINTMENT (OUTPATIENT)
Dept: CARDIOLOGY | Facility: HOSPITAL | Age: 37
End: 2022-06-10

## 2022-06-10 ENCOUNTER — APPOINTMENT (OUTPATIENT)
Dept: GENERAL RADIOLOGY | Facility: HOSPITAL | Age: 37
End: 2022-06-10

## 2022-06-10 VITALS
BODY MASS INDEX: 21.71 KG/M2 | WEIGHT: 118 LBS | HEART RATE: 60 BPM | OXYGEN SATURATION: 98 % | TEMPERATURE: 98.3 F | HEIGHT: 62 IN | DIASTOLIC BLOOD PRESSURE: 71 MMHG | RESPIRATION RATE: 16 BRPM | SYSTOLIC BLOOD PRESSURE: 117 MMHG

## 2022-06-10 PROBLEM — R00.2 PALPITATION: Status: ACTIVE | Noted: 2022-06-10

## 2022-06-10 LAB
ANION GAP SERPL CALCULATED.3IONS-SCNC: 7 MMOL/L (ref 5–15)
BASOPHILS # BLD AUTO: 0.1 10*3/MM3 (ref 0–0.2)
BASOPHILS NFR BLD AUTO: 0.7 % (ref 0–1.5)
BH CV ECHO MEAS - ACS: 1.95 CM
BH CV ECHO MEAS - AO MAX PG: 5.4 MMHG
BH CV ECHO MEAS - AO MEAN PG: 3 MMHG
BH CV ECHO MEAS - AO ROOT DIAM: 3 CM
BH CV ECHO MEAS - AO V2 MAX: 116.3 CM/SEC
BH CV ECHO MEAS - AO V2 VTI: 25.1 CM
BH CV ECHO MEAS - AVA(I,D): 2.43 CM2
BH CV ECHO MEAS - EDV(CUBED): 53.7 ML
BH CV ECHO MEAS - EDV(MOD-SP4): 66.6 ML
BH CV ECHO MEAS - EF(MOD-BP): 65 %
BH CV ECHO MEAS - EF(MOD-SP4): 65.3 %
BH CV ECHO MEAS - ESV(MOD-SP4): 23.1 ML
BH CV ECHO MEAS - IVS/LVPW: 0.91 CM
BH CV ECHO MEAS - IVSD: 0.88 CM
BH CV ECHO MEAS - LA A2CS (ATRIAL LENGTH): 2.9 CM
BH CV ECHO MEAS - LV DIASTOLIC VOL/BSA (35-75): 43.6 CM2
BH CV ECHO MEAS - LV MASS(C)D: 103.5 GRAMS
BH CV ECHO MEAS - LV MAX PG: 4.7 MMHG
BH CV ECHO MEAS - LV MEAN PG: 2.26 MMHG
BH CV ECHO MEAS - LV SYSTOLIC VOL/BSA (12-30): 15.1 CM2
BH CV ECHO MEAS - LV V1 MAX: 108.3 CM/SEC
BH CV ECHO MEAS - LV V1 VTI: 22.4 CM
BH CV ECHO MEAS - LVIDD: 3.8 CM
BH CV ECHO MEAS - LVOT AREA: 2.7 CM2
BH CV ECHO MEAS - LVOT DIAM: 1.86 CM
BH CV ECHO MEAS - LVPWD: 0.97 CM
BH CV ECHO MEAS - MV A MAX VEL: 82.4 CM/SEC
BH CV ECHO MEAS - MV DEC SLOPE: 612.5 CM/SEC2
BH CV ECHO MEAS - MV DEC TIME: 0.14 MSEC
BH CV ECHO MEAS - MV E MAX VEL: 83.5 CM/SEC
BH CV ECHO MEAS - MV E/A: 1.01
BH CV ECHO MEAS - MV MAX PG: 3.3 MMHG
BH CV ECHO MEAS - MV MEAN PG: 1.2 MMHG
BH CV ECHO MEAS - MV V2 VTI: 23.9 CM
BH CV ECHO MEAS - MVA(VTI): 2.6 CM2
BH CV ECHO MEAS - PA ACC TIME: 0.11 SEC
BH CV ECHO MEAS - PA PR(ACCEL): 29.2 MMHG
BH CV ECHO MEAS - PA V2 MAX: 75.8 CM/SEC
BH CV ECHO MEAS - RV MAX PG: 1.76 MMHG
BH CV ECHO MEAS - RV V1 MAX: 66.4 CM/SEC
BH CV ECHO MEAS - RV V1 VTI: 15.2 CM
BH CV ECHO MEAS - RVDD: 2.9 CM
BH CV ECHO MEAS - SI(MOD-SP4): 28.5 ML/M2
BH CV ECHO MEAS - SV(LVOT): 61 ML
BH CV ECHO MEAS - SV(MOD-SP4): 43.5 ML
BH CV ECHO MEAS - TR MAX PG: 12.4 MMHG
BH CV ECHO MEAS - TR MAX VEL: 172.9 CM/SEC
BUN SERPL-MCNC: 19 MG/DL (ref 6–20)
BUN/CREAT SERPL: 27.5 (ref 7–25)
CALCIUM SPEC-SCNC: 8.9 MG/DL (ref 8.6–10.5)
CHLORIDE SERPL-SCNC: 105 MMOL/L (ref 98–107)
CO2 SERPL-SCNC: 26 MMOL/L (ref 22–29)
CREAT SERPL-MCNC: 0.69 MG/DL (ref 0.57–1)
DEPRECATED RDW RBC AUTO: 41.1 FL (ref 37–54)
EGFRCR SERPLBLD CKD-EPI 2021: 114.8 ML/MIN/1.73
EOSINOPHIL # BLD AUTO: 0.2 10*3/MM3 (ref 0–0.4)
EOSINOPHIL NFR BLD AUTO: 1.7 % (ref 0.3–6.2)
ERYTHROCYTE [DISTWIDTH] IN BLOOD BY AUTOMATED COUNT: 13.2 % (ref 12.3–15.4)
GLUCOSE SERPL-MCNC: 108 MG/DL (ref 65–99)
HCG SERPL QL: NEGATIVE
HCT VFR BLD AUTO: 37.6 % (ref 34–46.6)
HGB BLD-MCNC: 12.7 G/DL (ref 12–15.9)
LV EF 2D ECHO EST: 60 %
LYMPHOCYTES # BLD AUTO: 3.5 10*3/MM3 (ref 0.7–3.1)
LYMPHOCYTES NFR BLD AUTO: 37.5 % (ref 19.6–45.3)
MAGNESIUM SERPL-MCNC: 1.9 MG/DL (ref 1.6–2.6)
MAXIMAL PREDICTED HEART RATE: 183 BPM
MCH RBC QN AUTO: 29.9 PG (ref 26.6–33)
MCHC RBC AUTO-ENTMCNC: 33.7 G/DL (ref 31.5–35.7)
MCV RBC AUTO: 88.6 FL (ref 79–97)
MONOCYTES # BLD AUTO: 0.8 10*3/MM3 (ref 0.1–0.9)
MONOCYTES NFR BLD AUTO: 8.8 % (ref 5–12)
NEUTROPHILS NFR BLD AUTO: 4.9 10*3/MM3 (ref 1.7–7)
NEUTROPHILS NFR BLD AUTO: 51.3 % (ref 42.7–76)
NRBC BLD AUTO-RTO: 0.1 /100 WBC (ref 0–0.2)
PLATELET # BLD AUTO: 234 10*3/MM3 (ref 140–450)
PMV BLD AUTO: 7.9 FL (ref 6–12)
POTASSIUM SERPL-SCNC: 4 MMOL/L (ref 3.5–5.2)
QT INTERVAL: 359 MS
QT INTERVAL: 368 MS
QT INTERVAL: 404 MS
RBC # BLD AUTO: 4.25 10*6/MM3 (ref 3.77–5.28)
SARS-COV-2 RNA RESP QL NAA+PROBE: NOT DETECTED
SODIUM SERPL-SCNC: 138 MMOL/L (ref 136–145)
STRESS TARGET HR: 156 BPM
TROPONIN T SERPL-MCNC: <0.01 NG/ML (ref 0–0.03)
TROPONIN T SERPL-MCNC: <0.01 NG/ML (ref 0–0.03)
WBC NRBC COR # BLD: 9.4 10*3/MM3 (ref 3.4–10.8)
WHOLE BLOOD HOLD SPECIMEN: NORMAL

## 2022-06-10 PROCEDURE — G0378 HOSPITAL OBSERVATION PER HR: HCPCS

## 2022-06-10 PROCEDURE — 84484 ASSAY OF TROPONIN QUANT: CPT | Performed by: EMERGENCY MEDICINE

## 2022-06-10 PROCEDURE — 83735 ASSAY OF MAGNESIUM: CPT | Performed by: EMERGENCY MEDICINE

## 2022-06-10 PROCEDURE — 85025 COMPLETE CBC W/AUTO DIFF WBC: CPT | Performed by: EMERGENCY MEDICINE

## 2022-06-10 PROCEDURE — 93306 TTE W/DOPPLER COMPLETE: CPT

## 2022-06-10 PROCEDURE — 93306 TTE W/DOPPLER COMPLETE: CPT | Performed by: INTERNAL MEDICINE

## 2022-06-10 PROCEDURE — 71045 X-RAY EXAM CHEST 1 VIEW: CPT

## 2022-06-10 PROCEDURE — 36415 COLL VENOUS BLD VENIPUNCTURE: CPT | Performed by: EMERGENCY MEDICINE

## 2022-06-10 PROCEDURE — 84703 CHORIONIC GONADOTROPIN ASSAY: CPT | Performed by: EMERGENCY MEDICINE

## 2022-06-10 PROCEDURE — 80048 BASIC METABOLIC PNL TOTAL CA: CPT | Performed by: EMERGENCY MEDICINE

## 2022-06-10 PROCEDURE — U0003 INFECTIOUS AGENT DETECTION BY NUCLEIC ACID (DNA OR RNA); SEVERE ACUTE RESPIRATORY SYNDROME CORONAVIRUS 2 (SARS-COV-2) (CORONAVIRUS DISEASE [COVID-19]), AMPLIFIED PROBE TECHNIQUE, MAKING USE OF HIGH THROUGHPUT TECHNOLOGIES AS DESCRIBED BY CMS-2020-01-R: HCPCS | Performed by: EMERGENCY MEDICINE

## 2022-06-10 RX ORDER — SODIUM CHLORIDE 0.9 % (FLUSH) 0.9 %
10 SYRINGE (ML) INJECTION AS NEEDED
Status: DISCONTINUED | OUTPATIENT
Start: 2022-06-10 | End: 2022-06-10 | Stop reason: HOSPADM

## 2022-06-10 RX ORDER — BISACODYL 5 MG/1
5 TABLET, DELAYED RELEASE ORAL DAILY PRN
Status: DISCONTINUED | OUTPATIENT
Start: 2022-06-10 | End: 2022-06-10 | Stop reason: HOSPADM

## 2022-06-10 RX ORDER — ONDANSETRON 4 MG/1
4 TABLET, FILM COATED ORAL EVERY 6 HOURS PRN
Status: DISCONTINUED | OUTPATIENT
Start: 2022-06-10 | End: 2022-06-10 | Stop reason: HOSPADM

## 2022-06-10 RX ORDER — BISACODYL 10 MG
10 SUPPOSITORY, RECTAL RECTAL DAILY PRN
Status: DISCONTINUED | OUTPATIENT
Start: 2022-06-10 | End: 2022-06-10 | Stop reason: HOSPADM

## 2022-06-10 RX ORDER — ONDANSETRON 2 MG/ML
4 INJECTION INTRAMUSCULAR; INTRAVENOUS EVERY 6 HOURS PRN
Status: DISCONTINUED | OUTPATIENT
Start: 2022-06-10 | End: 2022-06-10 | Stop reason: HOSPADM

## 2022-06-10 RX ORDER — SODIUM CHLORIDE 0.9 % (FLUSH) 0.9 %
10 SYRINGE (ML) INJECTION EVERY 12 HOURS SCHEDULED
Status: DISCONTINUED | OUTPATIENT
Start: 2022-06-10 | End: 2022-06-10 | Stop reason: HOSPADM

## 2022-06-10 RX ORDER — NITROGLYCERIN 0.4 MG/1
0.4 TABLET SUBLINGUAL
Status: DISCONTINUED | OUTPATIENT
Start: 2022-06-10 | End: 2022-06-10 | Stop reason: SDUPTHER

## 2022-06-10 RX ORDER — ONDANSETRON 2 MG/ML
4 INJECTION INTRAMUSCULAR; INTRAVENOUS EVERY 6 HOURS PRN
Status: DISCONTINUED | OUTPATIENT
Start: 2022-06-10 | End: 2022-06-10 | Stop reason: SDUPTHER

## 2022-06-10 RX ORDER — CHOLECALCIFEROL (VITAMIN D3) 125 MCG
5 CAPSULE ORAL NIGHTLY PRN
Status: DISCONTINUED | OUTPATIENT
Start: 2022-06-10 | End: 2022-06-10 | Stop reason: HOSPADM

## 2022-06-10 RX ORDER — PANTOPRAZOLE SODIUM 40 MG/1
40 TABLET, DELAYED RELEASE ORAL DAILY
Qty: 14 TABLET | Refills: 0 | Status: SHIPPED | OUTPATIENT
Start: 2022-06-10 | End: 2022-06-24

## 2022-06-10 RX ORDER — NALOXONE HCL 0.4 MG/ML
0.4 VIAL (ML) INJECTION
Status: DISCONTINUED | OUTPATIENT
Start: 2022-06-10 | End: 2022-06-10 | Stop reason: HOSPADM

## 2022-06-10 RX ORDER — ACETAMINOPHEN 325 MG/1
650 TABLET ORAL EVERY 4 HOURS PRN
Status: DISCONTINUED | OUTPATIENT
Start: 2022-06-10 | End: 2022-06-10 | Stop reason: SDUPTHER

## 2022-06-10 RX ORDER — POLYETHYLENE GLYCOL 3350 17 G/17G
17 POWDER, FOR SOLUTION ORAL DAILY PRN
Status: DISCONTINUED | OUTPATIENT
Start: 2022-06-10 | End: 2022-06-10 | Stop reason: HOSPADM

## 2022-06-10 RX ORDER — ACETAMINOPHEN 325 MG/1
650 TABLET ORAL EVERY 4 HOURS PRN
Status: DISCONTINUED | OUTPATIENT
Start: 2022-06-10 | End: 2022-06-10 | Stop reason: HOSPADM

## 2022-06-10 RX ORDER — ASPIRIN 81 MG/1
81 TABLET ORAL DAILY
Qty: 30 TABLET | Refills: 0 | Status: SHIPPED | OUTPATIENT
Start: 2022-06-10 | End: 2022-07-10

## 2022-06-10 RX ORDER — NITROGLYCERIN 0.4 MG/1
0.4 TABLET SUBLINGUAL
Status: DISCONTINUED | OUTPATIENT
Start: 2022-06-10 | End: 2022-06-10 | Stop reason: HOSPADM

## 2022-06-10 NOTE — CASE MANAGEMENT/SOCIAL WORK
Case Management Discharge Note      Final Note: Discharged prior to CM assessment.      Transportation Services  Private: Car    Final Discharge Disposition Code: 01 - home or self-care

## 2022-06-10 NOTE — PLAN OF CARE
Goal Outcome Evaluation:           Progress: improving  Outcome Evaluation: Patient had a normal echo. Patient will be discharging.

## 2022-06-10 NOTE — ED PROVIDER NOTES
Subjective   History of Present Illness  Lightheadedness, chest pressure, palpitations  36-year-old female states she had some lightheadedness this morning when she was at home she states she had lay down and felt somewhat lightheaded she denies room spinning dizziness.  She reports no focal numbness or weakness.  States she did have some mild pressure in the chest and has had some intermittent palpitations today.  She states the lightheadedness lasted about 30 minutes this morning and she had her spell this evening when she was coming home from visiting parents.  She reports no fevers or chills or shortness of breath or diaphoresis.  She reports no exertional chest pain.  Review of Systems   Constitutional: Negative.    HENT: Negative.    Eyes: Negative.    Respiratory: Positive for chest tightness. Negative for shortness of breath.    Cardiovascular: Positive for palpitations.   Gastrointestinal: Negative.    Genitourinary: Negative.    Musculoskeletal: Negative.    Skin: Negative.    Neurological: Positive for light-headedness. Negative for syncope and headaches.   Psychiatric/Behavioral: Negative.        Past Medical History:   Diagnosis Date   • Crohn's disease (HCC)        Allergies   Allergen Reactions   • Gluten Meal GI Intolerance       No past surgical history on file.    Family History   Problem Relation Age of Onset   • Polycystic kidney disease Mother    • Heart disease Father    • Polycystic kidney disease Sister    • Heart disease Maternal Grandmother    • Heart disease Paternal Grandmother        Social History     Socioeconomic History   • Marital status:    Tobacco Use   • Smoking status: Never Smoker   Substance and Sexual Activity   • Alcohol use: Not Currently   • Drug use: Never   • Sexual activity: Defer       Prior to Admission medications    Not on File     /70 (BP Location: Right arm, Patient Position: Sitting)   Pulse 97   Temp 97.5 °F (36.4 °C) (Oral)   Resp 16   Ht 157.5  "cm (62\")   Wt 54.9 kg (121 lb)   LMP 06/01/2022   SpO2 99%   BMI 22.13 kg/m²   I examined the patient using the appropriate personal protective equipment.        Objective   Physical Exam  General: Well-developed well-appearing, no acute distress, alert and appropriate  Eyes: Pupils round and normal, sclera nonicteric  HEENT: Mucous membranes moist, no mucosal swelling  Neck: Supple, no nuchal rigidity, no lymphadenopathy  Respirations: Respirations nonlabored, equal breath sounds bilaterally, clear lungs  Heart regular rate and rhythm, no murmurs rubs or gallops,   Abdomen soft nontender nondistended,   Extremities no clubbing cyanosis or edema, calves are symmetric and nontender  Neuro cranial nerves II through XII intact , normal sensory/motor function and strength in four extremities, no slurred speech, no facial droop, normal finger to nose, normal heel to shin, no nuchal rigidity  Psych oriented, pleasant affect  Skin no rash, brisk cap refill  Procedures           ED Course      Results for orders placed or performed during the hospital encounter of 06/09/22   Basic Metabolic Panel    Specimen: Blood   Result Value Ref Range    Glucose 108 (H) 65 - 99 mg/dL    BUN 19 6 - 20 mg/dL    Creatinine 0.69 0.57 - 1.00 mg/dL    Sodium 138 136 - 145 mmol/L    Potassium 4.0 3.5 - 5.2 mmol/L    Chloride 105 98 - 107 mmol/L    CO2 26.0 22.0 - 29.0 mmol/L    Calcium 8.9 8.6 - 10.5 mg/dL    BUN/Creatinine Ratio 27.5 (H) 7.0 - 25.0    Anion Gap 7.0 5.0 - 15.0 mmol/L    eGFR 114.8 >60.0 mL/min/1.73   Troponin    Specimen: Blood   Result Value Ref Range    Troponin T <0.010 0.000 - 0.030 ng/mL   Magnesium    Specimen: Blood   Result Value Ref Range    Magnesium 1.9 1.6 - 2.6 mg/dL   hCG, Serum, Qualitative    Specimen: Blood   Result Value Ref Range    HCG Qualitative Negative Negative   CBC Auto Differential    Specimen: Blood   Result Value Ref Range    WBC 9.40 3.40 - 10.80 10*3/mm3    RBC 4.25 3.77 - 5.28 10*6/mm3    " Hemoglobin 12.7 12.0 - 15.9 g/dL    Hematocrit 37.6 34.0 - 46.6 %    MCV 88.6 79.0 - 97.0 fL    MCH 29.9 26.6 - 33.0 pg    MCHC 33.7 31.5 - 35.7 g/dL    RDW 13.2 12.3 - 15.4 %    RDW-SD 41.1 37.0 - 54.0 fl    MPV 7.9 6.0 - 12.0 fL    Platelets 234 140 - 450 10*3/mm3    Neutrophil % 51.3 42.7 - 76.0 %    Lymphocyte % 37.5 19.6 - 45.3 %    Monocyte % 8.8 5.0 - 12.0 %    Eosinophil % 1.7 0.3 - 6.2 %    Basophil % 0.7 0.0 - 1.5 %    Neutrophils, Absolute 4.90 1.70 - 7.00 10*3/mm3    Lymphocytes, Absolute 3.50 (H) 0.70 - 3.10 10*3/mm3    Monocytes, Absolute 0.80 0.10 - 0.90 10*3/mm3    Eosinophils, Absolute 0.20 0.00 - 0.40 10*3/mm3    Basophils, Absolute 0.10 0.00 - 0.20 10*3/mm3    nRBC 0.1 0.0 - 0.2 /100 WBC   ECG 12 Lead   Result Value Ref Range    QT Interval 368 ms                                                MDM  My EKG interpretation sinus rhythm, no acute ST or T wave abnormality.  My chest x-ray interpretation no apparent acute abnormality.  I reviewed the medical record she had a negative stress Myoview in October 2021.  She has had Holter monitor this week but no report is available.  She describes some palpitations with some chest pressures.  She also describes some lightheadedness she has a low risk heart score.  She has normal troponin and EKG.  We shared decision making she does wish to proceed with observation with plan for additional troponin as well as 2D echo this morning to further evaluate cardiac function and her palpitations.  If negative I suspect she will be able to be discharged from the ops unit and follow-up with primary care and cardiology referral.  Final diagnoses:   Palpitations   Chest pain, unspecified type       ED Disposition  ED Disposition     ED Disposition   Decision to Admit    Condition   --    Comment   --             No follow-up provider specified.       Medication List      No changes were made to your prescriptions during this visit.          Maverick Almanza,  MD  06/10/22 0209

## 2022-06-10 NOTE — PLAN OF CARE
Goal Outcome Evaluation:  Plan of Care Reviewed With: patient        Progress: no change  Outcome Evaluation: New admit from ED with chest pain and palpitations. VSS. No complaints of pain or shortness of breath at this time. Patient to have 2D echo today. Will monitor.

## 2022-06-10 NOTE — DISCHARGE SUMMARY
[facility]    Mariusz Hitchcock MD    CHIEF COMPLAINT:         HISTORY OF PRESENT ILLNESS:    [facility]    ED 6/10/22: 36-year-old female states she had some lightheadedness this morning when she was at home she states she had lay down and felt somewhat lightheaded she denies room spinning dizziness.  She reports no focal numbness or weakness.  States she did have some mild pressure in the chest and has had some intermittent palpitations today.  She states the lightheadedness lasted about 30 minutes this morning and she had her spell this evening when she was coming home from visiting parents.  She reports no fevers or chills or shortness of breath or diaphoresis.  She reports no exertional chest pain.    Past Medical History:   Diagnosis Date   • Crohn's disease (HCC)      No past surgical history on file.  Family History   Problem Relation Age of Onset   • Polycystic kidney disease Mother    • Heart disease Father    • Polycystic kidney disease Sister    • Heart disease Maternal Grandmother    • Heart disease Paternal Grandmother      Social History     Tobacco Use   • Smoking status: Never Smoker   Substance Use Topics   • Alcohol use: Not Currently   • Drug use: Never     No medications prior to admission.     Allergies:  Gluten meal      There is no immunization history on file for this patient.        REVIEW OF SYSTEMS:    Review of Systems   Constitutional: Negative.   HENT: Negative.    Eyes: Negative.    Cardiovascular: Negative.    Respiratory: Negative.    Endocrine: Negative.    Hematologic/Lymphatic: Negative.    Skin: Negative.    Musculoskeletal: Negative.    Gastrointestinal: Positive for nausea.   Genitourinary: Negative.    Neurological: Negative.    Psychiatric/Behavioral: Negative.        Vital Signs  Temp:  [97.5 °F (36.4 °C)-98.3 °F (36.8 °C)] 98.3 °F (36.8 °C)  Heart Rate:  [60-97] 60  Resp:  [15-16] 16  BP: (109-128)/(70-84) 117/71          Physical Exam:  Physical  Exam  Constitutional:       Appearance: Normal appearance.   HENT:      Head: Normocephalic and atraumatic.      Right Ear: External ear normal.      Left Ear: External ear normal.      Nose: Nose normal.      Mouth/Throat:      Mouth: Mucous membranes are moist.      Pharynx: Oropharynx is clear.   Eyes:      Extraocular Movements: Extraocular movements intact.      Conjunctiva/sclera: Conjunctivae normal.      Pupils: Pupils are equal, round, and reactive to light.   Cardiovascular:      Rate and Rhythm: Normal rate and regular rhythm.      Pulses: Normal pulses.      Heart sounds: Normal heart sounds.   Pulmonary:      Effort: Pulmonary effort is normal.      Breath sounds: Normal breath sounds.   Abdominal:      General: Bowel sounds are normal.      Palpations: Abdomen is soft.   Musculoskeletal:      Cervical back: Normal range of motion and neck supple.   Skin:     General: Skin is warm.      Capillary Refill: Capillary refill takes less than 2 seconds.   Neurological:      General: No focal deficit present.      Mental Status: She is alert and oriented to person, place, and time. Mental status is at baseline.   Psychiatric:         Mood and Affect: Mood normal.         Behavior: Behavior normal.         Thought Content: Thought content normal.         Judgment: Judgment normal.         Emotional Behavior:    WNL   Debilities:   none  Results Review:    I reviewed the patient's new clinical results.  Lab Results (most recent)     Procedure Component Value Units Date/Time    Extra Tubes [[id number]] Collected: 06/10/22 0458    Specimen: Blood, Venous Line Updated: 06/10/22 0604    Narrative:      The following orders were created for panel order Extra Tubes.  Procedure                               Abnormality         Status                     ---------                               -----------         ------                     Lavender Top[[id number]]                                     Final result                  Please view results for these tests on the individual orders.    Lavender Top [[id number]] Collected: 06/10/22 0458    Specimen: Blood Updated: 06/10/22 0604     Extra Tube hold for add-on     Comment: Auto resulted       Troponin [[id number]]  (Normal) Collected: 06/10/22 0458    Specimen: Blood Updated: 06/10/22 0559     Troponin T <0.010 ng/mL     Narrative:      Troponin T Reference Range:  <= 0.03 ng/mL-   Negative for AMI  >0.03 ng/mL-     Abnormal for myocardial necrosis.  Clinicians would have to utilize clinical acumen, EKG, Troponin and serial changes to determine if it is an Acute Myocardial Infarction or myocardial injury due to an underlying chronic condition.       Results may be falsely decreased if patient taking Biotin.      COVID PRE-OP / PRE-PROCEDURE SCREENING ORDER (NO ISOLATION) - Swab, Nasopharynx [[id number]]  (Normal) Collected: 06/10/22 0216    Specimen: Swab from Nasopharynx Updated: 06/10/22 0259    Narrative:      The following orders were created for panel order COVID PRE-OP / PRE-PROCEDURE SCREENING ORDER (NO ISOLATION) - Swab, Nasopharynx.  Procedure                               Abnormality         Status                     ---------                               -----------         ------                     COVID-19,CEPHEID/PASCUAL,CO...[[id number]]  Normal              Final result                 Please view results for these tests on the individual orders.    COVID-19,CEPHEID/PASCUAL,COR/GUERO/PAD/DENISHA IN-HOUSE(OR EMERGENT/ADD-ON),NP SWAB IN TRANSPORT MEDIA 3-4 HR TAT, RT-PCR - Swab, Nasopharynx [[id number]]  (Normal) Collected: 06/10/22 0216    Specimen: Swab from Nasopharynx Updated: 06/10/22 0259     COVID19 Not Detected    Narrative:      Fact sheet for providers: https://www.fda.gov/media/095725/download     Fact sheet for patients: https://www.fda.gov/media/325512/download  Fact sheet for providers: https://www.fda.gov/media/531998/download     Fact sheet for patients:  https://www.fda.gov/media/252329/download    Basic Metabolic Panel [[id number]]  (Abnormal) Collected: 06/10/22 0032    Specimen: Blood Updated: 06/10/22 0106     Glucose 108 mg/dL      BUN 19 mg/dL      Creatinine 0.69 mg/dL      Sodium 138 mmol/L      Potassium 4.0 mmol/L      Chloride 105 mmol/L      CO2 26.0 mmol/L      Calcium 8.9 mg/dL      BUN/Creatinine Ratio 27.5     Anion Gap 7.0 mmol/L      eGFR 114.8 mL/min/1.73      Comment: National Kidney Foundation and American Society of Nephrology (ASN) Task Force recommended calculation based on the Chronic Kidney Disease Epidemiology Collaboration (CKD-EPI) equation refit without adjustment for race.       Narrative:      GFR Normal >60  Chronic Kidney Disease <60  Kidney Failure <15      Troponin [[id number]]  (Normal) Collected: 06/10/22 0032    Specimen: Blood Updated: 06/10/22 0106     Troponin T <0.010 ng/mL     Narrative:      Troponin T Reference Range:  <= 0.03 ng/mL-   Negative for AMI  >0.03 ng/mL-     Abnormal for myocardial necrosis.  Clinicians would have to utilize clinical acumen, EKG, Troponin and serial changes to determine if it is an Acute Myocardial Infarction or myocardial injury due to an underlying chronic condition.       Results may be falsely decreased if patient taking Biotin.      Magnesium [[id number]]  (Normal) Collected: 06/10/22 0032    Specimen: Blood Updated: 06/10/22 0106     Magnesium 1.9 mg/dL     hCG, Serum, Qualitative [[id number]]  (Normal) Collected: 06/10/22 0032    Specimen: Blood Updated: 06/10/22 0054     HCG Qualitative Negative    CBC & Differential [[id number]]  (Abnormal) Collected: 06/10/22 0032    Specimen: Blood Updated: 06/10/22 0047    Narrative:      The following orders were created for panel order CBC & Differential.  Procedure                               Abnormality         Status                     ---------                               -----------         ------                     CBC Auto  Differential[[id number]]        Abnormal            Final result                 Please view results for these tests on the individual orders.    CBC Auto Differential [[id number]]  (Abnormal) Collected: 06/10/22 0032    Specimen: Blood Updated: 06/10/22 0047     WBC 9.40 10*3/mm3      RBC 4.25 10*6/mm3      Hemoglobin 12.7 g/dL      Hematocrit 37.6 %      MCV 88.6 fL      MCH 29.9 pg      MCHC 33.7 g/dL      RDW 13.2 %      RDW-SD 41.1 fl      MPV 7.9 fL      Platelets 234 10*3/mm3      Neutrophil % 51.3 %      Lymphocyte % 37.5 %      Monocyte % 8.8 %      Eosinophil % 1.7 %      Basophil % 0.7 %      Neutrophils, Absolute 4.90 10*3/mm3      Lymphocytes, Absolute 3.50 10*3/mm3      Monocytes, Absolute 0.80 10*3/mm3      Eosinophils, Absolute 0.20 10*3/mm3      Basophils, Absolute 0.10 10*3/mm3      nRBC 0.1 /100 WBC           Imaging Results (Most Recent)     Procedure Component Value Units Date/Time    XR Chest 1 View [[id number]] Collected: 06/10/22 0734     Updated: 06/10/22 0736    Narrative:      XR CHEST 1 VW-     Date of Exam: 6/10/2022 12:38 AM     Indication: pressure.     Comparison Exams: June 8, 2022     Technique: Single AP chest radiograph     FINDINGS:  The lungs are clear. The heart and mediastinal contours appear normal.  The pulmonary vasculature appears normal. The osseous structures appear  intact.       Impression:      No acute cardiopulmonary process identified.     Electronically Signed By-Mariusz Camarillo MD On:6/10/2022 7:34 AM  This report was finalized on [id number] by  Mariusz Camarillo MD.        reviewed    ECG/EMG Results (most recent)     Procedure Component Value Units Date/Time    ECG 12 Lead [[id number]] Collected: 06/09/22 2342     Updated: 06/09/22 2343     QT Interval 368 ms     Narrative:      HEART RATE= 75  bpm  RR Interval= 804  ms  CA Interval= 129  ms  P Horizontal Axis= -6  deg  P Front Axis= 71  deg  QRSD Interval= 79  ms  QT Interval= 368  ms  QRS Axis= 61  deg  T  Wave Axis= 54  deg  - NORMAL ECG -  Sinus rhythm  Electronically Signed By:   Date and Time of Study: 2022-06-09 23:42:21    Adult Transthoracic Echo Complete W/ Cont if Necessary Per Protocol [[id number]] Resulted: 06/10/22 0900     Updated: 06/10/22 0904     Target HR (85%) 156 bpm      Max. Pred. HR (100%) 183 bpm      ACS 1.95 cm      Ao root diam 3.0 cm      Ao pk jeanie 116.3 cm/sec      Ao V2 VTI 25.1 cm      CELSO(I,D) 2.43 cm2      EDV(cubed) 53.7 ml      EDV(MOD-sp4) 66.6 ml      EF(MOD-bp) 65.0 %      EF(MOD-sp4) 65.3 %      ESV(MOD-sp4) 23.1 ml      IVS/LVPW 0.91 cm      LV mass(C)d 103.5 grams      LV V1 max PG 4.7 mmHg      LV V1 mean PG 2.26 mmHg      LV V1 max 108.3 cm/sec      LVPWd 0.97 cm      MV dec slope 612.5 cm/sec2      MV dec time 0.14 msec      MV V2 VTI 23.9 cm      MVA(VTI) 2.6 cm2      PA acc time 0.11 sec      PA pr(Accel) 29.2 mmHg      PA V2 max 75.8 cm/sec      RV V1 max PG 1.76 mmHg      RV V1 max 66.4 cm/sec      RV V1 VTI 15.2 cm      RVIDd 2.9 cm      SI(MOD-sp4) 28.5 ml/m2      SV(LVOT) 61.0 ml      SV(MOD-sp4) 43.5 ml      TR max PG 12.4 mmHg      Ao max PG 5.4 mmHg      Ao mean PG 3.0 mmHg      IVSd 0.88 cm      LV V1 VTI 22.4 cm      LVIDd 3.8 cm      LVOT area 2.7 cm2      LVOT diam 1.86 cm      MV E/A 1.01     MV max PG 3.3 mmHg      MV mean PG 1.20 mmHg      MV A max jeanie 82.4 cm/sec      MV E max jeanie 83.5 cm/sec      TR max jeanie 172.9 cm/sec      LV Mayer Vol (BSA corrected) 43.6 cm2      LV Sys Vol (BSA corrected) 15.1 cm2      LA length (A2C) 2.9 cm      Echo EF Estimated 60 %     Narrative:      · Estimated left ventricular EF = 60% Left ventricular systolic function   is normal.     Indications  Palpitations    Technically satisfactory study.  Mitral valve is structurally normal.  Tricuspid valve is structurally normal.  Aortic valve is structurally normal.  Pulmonic valve could not be well visualized.  No evidence for mitral tricuspid or aortic regurgitation is seen by    Doppler study.  Left atrium is normal in size.  Right atrium is normal in size.  Left ventricle is normal in size and contractility with ejection fraction   of 60%.  Right ventricle is normal in size.  Atrial septum is intact.  Aorta is normal.  No pericardial effusion or intracardiac thrombus is seen.    Impression  Structurally and functionally normal cardiac valves.  Left ventricular size and contractility is normal with ejection fraction   of 60%.        reviewed        Results for orders placed during the hospital encounter of 06/09/22    Adult Transthoracic Echo Complete W/ Cont if Necessary Per Protocol    Interpretation Summary  · Estimated left ventricular EF = 60% Left ventricular systolic function is normal.    Indications  Palpitations    Technically satisfactory study.  Mitral valve is structurally normal.  Tricuspid valve is structurally normal.  Aortic valve is structurally normal.  Pulmonic valve could not be well visualized.  No evidence for mitral tricuspid or aortic regurgitation is seen by Doppler study.  Left atrium is normal in size.  Right atrium is normal in size.  Left ventricle is normal in size and contractility with ejection fraction of 60%.  Right ventricle is normal in size.  Atrial septum is intact.  Aorta is normal.  No pericardial effusion or intracardiac thrombus is seen.    Impression  Structurally and functionally normal cardiac valves.  Left ventricular size and contractility is normal with ejection fraction of 60%.      Microbiology Results (last 10 days)     Procedure Component Value - Date/Time    COVID PRE-OP / PRE-PROCEDURE SCREENING ORDER (NO ISOLATION) - Swab, Nasopharynx [[id number]]  (Normal) Collected: 06/10/22 0216    Lab Status: Final result Specimen: Swab from Nasopharynx Updated: 06/10/22 0259    Narrative:      The following orders were created for panel order COVID PRE-OP / PRE-PROCEDURE SCREENING ORDER (NO ISOLATION) - Swab, Nasopharynx.  Procedure                                Abnormality         Status                     ---------                               -----------         ------                     COVID-19,CEPHEID/PASCUAL,CO...[[id number]]  Normal              Final result                 Please view results for these tests on the individual orders.    COVID-19,CEPHEID/PASCUAL,COR/GUERO/PAD/DENISHA IN-HOUSE(OR EMERGENT/ADD-ON),NP SWAB IN TRANSPORT MEDIA 3-4 HR TAT, RT-PCR - Swab, Nasopharynx [[id number]]  (Normal) Collected: 06/10/22 0216    Lab Status: Final result Specimen: Swab from Nasopharynx Updated: 06/10/22 0259     COVID19 Not Detected    Narrative:      Fact sheet for providers: https://www.fda.gov/media/843029/download     Fact sheet for patients: https://www.fda.gov/media/046992/download  Fact sheet for providers: https://www.fda.gov/media/514906/download     Fact sheet for patients: https://www.fda.gov/media/209411/download          Assessment & Plan     Palpitation     1. Palpitations  - Holter monitor this past week with cardiology  - 2D echo results EF 60% with normal readings  - Follow-up with cardiology in two weeks   - CXR: no acute cardiopulmonary process  - EKG: NSR  - Monitor serial troponin; last troponin 0.010  - Continue cardiac monitoring  - Continue NTG SL PRN for CP if systolic bp above 90mmHg  - Continue ASA  - Monitor blood pressures at home.    I discussed the patients findings and my recommendations with patient.    Discharge Diagnosis:      Palpitation      Hospital Course  Patient is a 36 y.o. female presented with palpitations. Patient reports palpitations, dizziness, and nausea for the past few days but this episode has lasted since yesterday that has persisted. At time of exam, denies chest pain, pressure, dyspnea, dizziness, syncope, nausea, vomiting, or fever. 2D echo shows normal results. Patient to follow- up with primary care and cardiology outpatient for continued care. Patient to call 911 or go to nearest emergency department if  symptoms worsen.I have discussed the findings and my recommendations with the patient. She is agreeable with the plan.     Past Medical History:     Past Medical History:   Diagnosis Date   • Crohn's disease (HCC)        Past Surgical History:   No past surgical history on file.    Social History:   Social History     Socioeconomic History   • Marital status:    Tobacco Use   • Smoking status: Never Smoker   Substance and Sexual Activity   • Alcohol use: Not Currently   • Drug use: Never   • Sexual activity: Defer       Procedures Performed         Consults:   Consults     No orders found for last 30 day(s).          Condition on Discharge:     Stable    Discharge Disposition  Home or Self Care    Discharge Medications     Discharge Medications      New Medications      Instructions Start Date   aspirin 81 MG EC tablet   81 mg, Oral, Daily      pantoprazole 40 MG EC tablet  Commonly known as: PROTONIX   40 mg, Oral, Daily             Discharge Diet:   Diet Instructions     Diet: Regular      Discharge Diet: Regular          Activity at Discharge:   Activity Instructions     Activity as Tolerated      Measure Blood Pressure            Follow-up Appointments  No future appointments.  Additional Instructions for the Follow-ups that You Need to Schedule     Discharge Follow-up with PCP   As directed       Currently Documented PCP:    Mariusz Hitchcock MD    PCP Phone Number:    [phone]     Follow Up Details: 7-10 days         Discharge Follow-up with Specialty: cardiology; 2 Weeks   As directed      Specialty: cardiology    Follow Up: 2 Weeks               Test Results Pending at Discharge       Risk for Readmission (LACE) Score: 2 (6/10/2022  6:02 AM)          VIRA Espitia  06/10/22  09:43 EDT

## 2022-06-16 ENCOUNTER — TRANSCRIBE ORDERS (OUTPATIENT)
Dept: ADMINISTRATIVE | Facility: HOSPITAL | Age: 37
End: 2022-06-16

## 2022-06-16 DIAGNOSIS — R00.2 PALPITATIONS: Primary | ICD-10-CM

## 2022-06-23 ENCOUNTER — APPOINTMENT (OUTPATIENT)
Dept: RESPIRATORY THERAPY | Facility: HOSPITAL | Age: 37
End: 2022-06-23

## 2022-06-27 ENCOUNTER — APPOINTMENT (OUTPATIENT)
Dept: RESPIRATORY THERAPY | Facility: HOSPITAL | Age: 37
End: 2022-06-27

## 2022-07-01 ENCOUNTER — APPOINTMENT (OUTPATIENT)
Dept: RESPIRATORY THERAPY | Facility: HOSPITAL | Age: 37
End: 2022-07-01

## 2023-08-18 NOTE — ED NOTES
Patient provided with discharge and followup information. Patient verbalized understanding. PIV removed intact and per protocol. Patient alert and oriented x4 upon discharge. Patient ambulated out to POV with spouse with steady gait, no deficits noted by this RN.   What else is she on right now for her diabetes?

## 2025-05-27 NOTE — PROGRESS NOTES
Office Note          Name: Parul Iyer    : 1985     MRN: [id number]     Chief Complaint  Annual Exam    Subjective     Parul Iyer is a 39 y.o. female here for her annual wellness and physical exam.    Parul is additionally here for coordination of medical care, to discuss health maintenance, disease prevention as well as to follow-up on medical problems.      HPI  Previous general provider - Dr. Mariusz Hitchcock, Sept/Oct 2023.    Preventive Care:  Dentist: [facility]  Ophthalmologist: Bharti  Specialty care providers:   Gastroenterology: Alex Rubalcava  Allergist: Dr. Ramy Hitchcock      Obstetrics Gynecologic History:   Obstetric and/or gynecological care completed by Mathieu Mir NP  Last menstrual period date was started today.    Sexually active: Yes  History of STD's: No  Last Pap smear: 2 years ago  History of abnormal pap smears: No  Four girls - 16, 14, 9, 7.     Monthly Breast Self Exam:Does not perform monthly breast exam        History of Present Illness  The patient is a 39-year-old female who presents to establish care with a new provider.    She has been experiencing Crohn's disease symptoms since 2024, initially presenting as mucus in her stool. Despite managing the condition through dietary modifications for over a decade, she continues to struggle with blood and mucus in her stool, although there has been some improvement.She frequently feels bloated and gassy, which she believes may be related to her Crohn's disease. She also reports joint inflammation and sporadic muscle pain, which she suspects may be age-related or linked to her Crohn's disease. She has a known gluten allergy    She reports no abdominal pain but does experience distention. She maintains a clean diet, avoiding grains, sugar, and processed foods, but still experiences bloating and gas. She experiences random muscle aches and pains, which she attributes to her Crohn's disease. She has not undergone  food allergy testing.    he has gained approximately 5 pounds while attempting to manage her Crohn's disease through diet.     She also reports constant fatigue, regardless of sleep duration, which she attributes to her perimenopausal state. She experiences excessive sweating, particularly underarm perspiration, and ongoing hair loss since the birth of her last child 8 years ago.     She reports no chest pain, palpitations, leg or foot swelling, coughing, wheezing, shortness of breath, sleep apnea, or snoring.     She reports no breast lumps, bumps, discharge, masses, menstrual issues, pelvic pain or pressure during intercourse, unusual vaginal bleeding, discharge or pain. S    She reports no sleep issues, stress, anxiety, or depression, but does not feel rested upon waking. She does not take any sleep aids. She reports no ear, nose, or throat problems, chills, fever, or unexpected weight loss.     She does not exercise regularly but does engage in family walks and bike rides.     She has been dealing with hives for nearly 2 years, which prompted her to see an allergist. She has identified 9 contact allergens, 3 of which are severe, and avoids the top 3, which has provided some relief. However, the hives have not completely resolved and are most commonly located on her arms and inner legs. She does not take daily allergy medication.     She reports no easy bruising or bleeding.     She does not take any daily medications and has not had a mammogram.  She is due for a colonoscopy but has been avoiding it.   She uses sun protection for her skin and is working on maintaining a healthy diet.   She drinks plenty of water.   She is interested in having her hemoglobin A1c, vitamin D, zinc, magnesium levels checked, as well as undergoing a chronic inflammation screen and anemia panel.   Her last colonoscopy was in 03/2012 during an active flare-up.   She has not seen her gastroenterologist Dr. Rubalcava in approximately 5  "years.    GYNECOLOGICAL HISTORY:  Last Menstrual Period: 05/29/2025    SOCIAL HISTORY  She has 4 children aged 16, 14, 9 and 7.    FAMILY HISTORY  Her mother has polycystic kidney disease.  Her father has heart disease.  Her maternal grandmother had polycystic kidney disease and colon cancer.  Her paternal grandmother had heart disease.  Her maternal grandmother had type 2 diabetes.  Her youngest brother had infantile fibrosarcoma.      Preventive Health/Lifestyle:     General Health: good  Nutrition: in general, a \"healthy\" diet  , gluten-free  Activity level is rarely.   Exercises 0 per week.  Energy level is fair.  Sleep: fairly well  Hours of Sleep: 6  Sleep Apnea or Snoring: No  Skin Self Exam: occasionally  Monthly Breast Self Exam: Does not perform monthly breast exam  Libido: good  Contraceptive History: None     Last Physical Exam: 9/2023   Last tetanus shot was unknown. Patient is doing routine self breast exams: occasionally.  Pap Smear:   Last Completed Pap Smear    This patient has no relevant Health Maintenance data.      Findings on last pap: patient does not recall when last pap was}  Mammogram:   Last Completed Mammogram    This patient has no relevant Health Maintenance data.      she has never had a mammogram  Bone Dexa scan: None  Colonoscopy:   Last Completed Colonoscopy    This patient has no relevant Health Maintenance data.      Patient has had several does not remember the last one maybe 2012.     Recent Hospitalizations:  No hospitalization(s) within the last year..       I personally reviewed and updated the patient's allergies, medications, problem list, and past medical, surgical, social, and family history.     No current outpatient medications on file.      Allergies   Allergen Reactions   • Gluten Meal GI Intolerance, Diarrhea, Nausea And Vomiting and Other (See Comments)       Immunization History   Administered Date(s) Administered   • Hepatitis B Adult/Adolescent IM 09/12/2013, " 10/24/2013, 2014       Review of Systems   Constitutional:  Positive for fatigue.   HENT: Negative.     Eyes: Negative.    Respiratory: Negative.     Cardiovascular: Negative.    Gastrointestinal:  Positive for abdominal distention, blood in stool and constipation. Negative for abdominal pain and anal bleeding.   Endocrine: Negative.    Genitourinary: Negative.  Negative for menstrual problem.   Musculoskeletal:  Positive for myalgias (random myalgia).        Elbows, knees, random.     Skin: Negative.    Allergic/Immunologic: Positive for food allergies (gluten).   Hematological: Negative.    Psychiatric/Behavioral:  Negative for self-injury, sleep disturbance, suicidal ideas, depressed mood and stress. The patient is not nervous/anxious.           Past Medical History:   Diagnosis Date   • Crohn's disease        Past Surgical History:   Procedure Laterality Date   •  SECTION         Family History   Problem Relation Age of Onset   • Polycystic kidney disease Mother    • Heart disease Father    • Other (infantile fibrosarcoma) Brother    • Polycystic kidney disease Maternal Grandmother    • Colon cancer Maternal Grandmother         in 70's.   • Diabetes type II Maternal Grandmother    • Heart disease Paternal Grandmother        Social History     Socioeconomic History   • Marital status:    Tobacco Use   • Smoking status: Never     Passive exposure: Past   • Smokeless tobacco: Never   Vaping Use   • Vaping status: Never Used   Substance and Sexual Activity   • Alcohol use: Not Currently   • Drug use: Never   • Sexual activity: Defer     Partners: Male     Birth control/protection: None       The ASCVD Risk score (Jose DK, et al., 2019) failed to calculate for the following reasons:    Cannot find a previous HDL lab    Cannot find a previous total cholesterol lab    PHQ-2 Depression Screening      2025     8:32 AM   PHQ-2/PHQ-9 Depression Screening   Little interest or pleasure in doing  "things Not at all   Feeling down, depressed, or hopeless Not at all   How difficult have these problems made it for you to do your work, take care of things at home, or get along with other people? Not difficult at all       Fall Risk Screen:  JOSE Fall Risk Assessment has not been completed.    Objective     Vital Signs:  /72   Pulse 74   Temp 97.3 °F (36.3 °C) (Temporal)   Resp 18   Ht 157.5 cm (62.01\")   Wt 54 kg (119 lb)   SpO2 98%   BMI 21.76 kg/m²     BP Readings from Last 2 Encounters:   05/29/25 115/72   06/10/22 117/71       Wt Readings from Last 2 Encounters:   05/29/25 54 kg (119 lb)   06/10/22 53.5 kg (118 lb)       BMI is within normal parameters. No other follow-up for BMI required.         Physical Exam:  Physical Exam  Vitals and nursing note reviewed.   Constitutional:       General: She is not in acute distress.     Appearance: Normal appearance. She is well-groomed. She is not ill-appearing.   HENT:      Head: Normocephalic and atraumatic.      Jaw: There is normal jaw occlusion.      Right Ear: Hearing, tympanic membrane, ear canal and external ear normal.      Left Ear: Hearing, tympanic membrane, ear canal and external ear normal.      Nose: Nose normal.      Mouth/Throat:      Lips: Pink.      Mouth: Mucous membranes are moist.      Pharynx: Oropharynx is clear. Uvula midline.   Eyes:      General: Lids are normal. Vision grossly intact.      Extraocular Movements: Extraocular movements intact.      Conjunctiva/sclera: Conjunctivae normal.      Pupils: Pupils are equal, round, and reactive to light. Pupils are equal.   Neck:      Thyroid: No thyromegaly or thyroid tenderness.      Vascular: No carotid bruit.   Cardiovascular:      Rate and Rhythm: Normal rate and regular rhythm.      Pulses: Normal pulses.      Heart sounds: Normal heart sounds.   Pulmonary:      Effort: Pulmonary effort is normal.      Breath sounds: Normal breath sounds and air entry.   Abdominal:      " General: Abdomen is flat. Bowel sounds are normal.      Palpations: Abdomen is soft. Abdomen is not rigid.      Tenderness: There is no abdominal tenderness. There is no right CVA tenderness or left CVA tenderness.   Musculoskeletal:         General: Normal range of motion.      Cervical back: Full passive range of motion without pain, normal range of motion and neck supple.      Right lower leg: No edema.      Left lower leg: No edema.   Skin:     General: Skin is warm and dry.      Capillary Refill: Capillary refill takes less than 2 seconds.   Neurological:      General: No focal deficit present.      Mental Status: She is alert and oriented to person, place, and time. Mental status is at baseline.   Psychiatric:         Attention and Perception: Attention and perception normal.         Mood and Affect: Mood and affect normal.         Speech: Speech normal.         Behavior: Behavior normal. Behavior is cooperative.         Thought Content: Thought content normal.       Derm Physical Exam  Physical Exam  Mouth/Throat: Mucous membranes moist, no erythema, no exudate  Respiratory: Clear to auscultation, no wheezing, rales or rhonchi  Cardiovascular: Regular rate and rhythm, no murmurs, rubs, or gallops    Results:   Result Review :   Results          Labs:       Imaging:   No valid procedures specified.        Data reviewed:          Assessment / Plan      Assessment/Plan:     Assessment & Plan  Encounter for wellness examination    Orders:  •  CBC & Differential  •  Comprehensive Metabolic Panel  •  POC Urinalysis Dipstick, Multipro    Encounter to establish care         Crohn's disease with complication, unspecified gastrointestinal tract location    Orders:  •  Food Allergy Profile  •  Zinc  •  Magnesium  •  Inflammatory Bowel Disease Panel  •  Sedimentation Rate  •  C-reactive Protein  •  Viscosity, Serum    Chronic fatigue         Perimenopausal         Excessive sweating         Hair loss         Family  history of heart disease         Non-restorative sleep         Hives of unknown origin         Encounter for screening mammogram for malignant neoplasm of breast    Orders:  •  Mammo Screening Digital Tomosynthesis Bilateral With CAD; Future    Encounter for vitamin deficiency screening    Orders:  •  Vitamin D 25 hydroxy    Screening for thyroid disorder    Orders:  •  TSH Rfx On Abnormal To Free T4    Screening for diabetes mellitus    Orders:  •  Hemoglobin A1c    Screening for lipid disorders    Orders:  •  Lipid Panel With / Chol / HDL Ratio    Family history of colon cancer    Orders:  •  Ambulatory Referral For Screening Colonoscopy    Screening for colon cancer    Orders:  •  Ambulatory Referral For Screening Colonoscopy      Assessment & Plan  1. Health maintenance.  - She is advised to maintain a healthy lifestyle by exercising for at least 150 minutes per week, abstaining from drug use, limiting alcohol consumption, avoiding smoking, restricting caffeine intake to 1 or 2 servings per day, and ensuring adequate hydration with a minimum of 8 bottles of water daily.  - A Mediterranean diet is recommended for heart health.  - A mammogram has been ordered and will be scheduled accordingly. A colonoscopy has been ordered due to her history of Crohn's disease and a family history of colon cancer.  - She is encouraged to continue her appointments with [facility] and to complete the necessary blood work. A urinalysis will be conducted to rule out the presence of sugar or protein in her urine.    2. Crohn's disease.  - She reports ongoing issues with blood and mucus in her stool since 09/2024. She has been managing her symptoms with diet but continues to experience fatigue, bloating, and joint inflammation.  - Physical exam findings include distention and random joint inflammation.  - A comprehensive blood work panel has been ordered, including CBC, CMP, cholesterol, thyroid function tests, urinalysis,  food allergy panel, hemoglobin A1c, vitamin D, zinc, magnesium levels, sed rate, CRP, and an anemia panel. If the CBC indicates anemia, further tests such as an iron panel and ferritin panel will be conducted.  - Colonoscopy ordered for history of Crohn's and family history of colon cancer.    3. Fatigue.  - She reports persistent fatigue despite adequate sleep.  - Thyroid function test and glucose levels will be checked to rule out any underlying conditions contributing to her fatigue.  - Discussed the possibility of thyroid dysfunction contributing to fatigue and hair loss.  - Advised to take melatonin 10 mg nightly approximately 30 minutes before bedtime.    4. Perimenopausal symptoms.  - She reports symptoms consistent with perimenopause, including profuse sweating and hair loss.  - No specific physical exam findings discussed.  - No specific treatment was discussed during this visit.  - Monitoring symptoms and considering further evaluation if symptoms persist.    5. Insomnia.  - She reports difficulty feeling rested despite adequate sleep.  - No specific physical exam findings discussed.  - Advised to take melatonin 10 mg nightly approximately 30 minutes before bedtime.  - Monitoring effectiveness of melatonin for improving sleep quality.    6. Hives.  - She has been dealing with hives for almost 2 years, primarily on her arms and the inside of her legs.  - Identified and avoids the top three contact allergens, which has helped but not completely resolved the hives.  - No new allergy medication was prescribed.  - Monitoring hives and considering further evaluation if symptoms persist.    Follow-up  The patient will follow up in 1 year for a repeat physical examination.       Follow Up   Wrapup Tab  Return for Annual physical and lab result discussion. .    There are no Patient Instructions on file for this visit.   Patient was given instructions and counseling regarding her condition or for health maintenance  advice. Please see specific information pulled into the AVS if appropriate.  Hand hygiene was performed during entrance to exam room and following assessment of patient. This document is intended for medical expert use only.     EMR Dragon/Transcription disclaimer:   Much of this encounter note is an electronic transcription/translation of spoken language to printed text. The electronic translation of spoken language may permit erroneous, or at times, nonsensical words or phrases to be inadvertently transcribed.      VIRA Patrick, FNP-C  [address]  [facility]  [address] [place] [address] [address] BRIDGET PHILLIPS [address] [place] [id number] [phone]      Patient or patient representative verbalized consent for the use of Ambient Listening during the visit with  VIRA Patrick for chart documentation. 5/29/2025  08:38 EDT

## 2025-05-29 ENCOUNTER — OFFICE VISIT (OUTPATIENT)
Dept: FAMILY MEDICINE CLINIC | Facility: CLINIC | Age: 40
End: 2025-05-29
Payer: COMMERCIAL

## 2025-05-29 ENCOUNTER — PATIENT ROUNDING (BHMG ONLY) (OUTPATIENT)
Dept: FAMILY MEDICINE CLINIC | Facility: CLINIC | Age: 40
End: 2025-05-29
Payer: COMMERCIAL

## 2025-05-29 VITALS
WEIGHT: 119 LBS | BODY MASS INDEX: 21.9 KG/M2 | OXYGEN SATURATION: 98 % | DIASTOLIC BLOOD PRESSURE: 72 MMHG | HEIGHT: 62 IN | RESPIRATION RATE: 18 BRPM | HEART RATE: 74 BPM | TEMPERATURE: 97.3 F | SYSTOLIC BLOOD PRESSURE: 115 MMHG

## 2025-05-29 DIAGNOSIS — K50.919 CROHN'S DISEASE WITH COMPLICATION, UNSPECIFIED GASTROINTESTINAL TRACT LOCATION: ICD-10-CM

## 2025-05-29 DIAGNOSIS — Z13.29 SCREENING FOR THYROID DISORDER: ICD-10-CM

## 2025-05-29 DIAGNOSIS — N95.1 PERIMENOPAUSAL: ICD-10-CM

## 2025-05-29 DIAGNOSIS — Z12.11 SCREENING FOR COLON CANCER: ICD-10-CM

## 2025-05-29 DIAGNOSIS — Z12.31 ENCOUNTER FOR SCREENING MAMMOGRAM FOR MALIGNANT NEOPLASM OF BREAST: ICD-10-CM

## 2025-05-29 DIAGNOSIS — Z13.21 ENCOUNTER FOR VITAMIN DEFICIENCY SCREENING: ICD-10-CM

## 2025-05-29 DIAGNOSIS — Z80.0 FAMILY HISTORY OF COLON CANCER: ICD-10-CM

## 2025-05-29 DIAGNOSIS — G47.8 NON-RESTORATIVE SLEEP: ICD-10-CM

## 2025-05-29 DIAGNOSIS — Z82.49 FAMILY HISTORY OF HEART DISEASE: ICD-10-CM

## 2025-05-29 DIAGNOSIS — Z13.1 SCREENING FOR DIABETES MELLITUS: ICD-10-CM

## 2025-05-29 DIAGNOSIS — R61 EXCESSIVE SWEATING: ICD-10-CM

## 2025-05-29 DIAGNOSIS — R53.82 CHRONIC FATIGUE: ICD-10-CM

## 2025-05-29 DIAGNOSIS — Z00.00 ENCOUNTER FOR WELLNESS EXAMINATION: Primary | ICD-10-CM

## 2025-05-29 DIAGNOSIS — L65.9 HAIR LOSS: ICD-10-CM

## 2025-05-29 DIAGNOSIS — Z13.220 SCREENING FOR LIPID DISORDERS: ICD-10-CM

## 2025-05-29 DIAGNOSIS — L50.9 HIVES OF UNKNOWN ORIGIN: ICD-10-CM

## 2025-05-29 DIAGNOSIS — Z76.89 ENCOUNTER TO ESTABLISH CARE: ICD-10-CM

## 2025-05-29 NOTE — PROGRESS NOTES
May 29, 2025    Hello, may I speak with Parul Iyer?    My name is MARIE      I am  with [address]HA[facility] [address]  [facility]  [address] [place] [address] [address]  [place] [address] [phone].    Before we get started may I verify your date of birth? 1985    I am calling to officially welcome you to our practice and ask about your recent visit. Is this a good time to talk? yes    Tell me about your visit with us. What things went well?  ALL WENT WELL       We're always looking for ways to make our patients' experiences even better. Do you have recommendations on ways we may improve?  no    Overall were you satisfied with your first visit to our practice? yes       I appreciate you taking the time to speak with me today. Is there anything else I can do for you? no      Thank you, and have a great day.

## 2025-05-29 NOTE — ASSESSMENT & PLAN NOTE
Orders:    Food Allergy Profile    Zinc    Magnesium    Inflammatory Bowel Disease Panel    Sedimentation Rate    C-reactive Protein    Viscosity, Serum

## 2025-06-04 LAB
25(OH)D3+25(OH)D2 SERPL-MCNC: 94.1 NG/ML (ref 30–100)
ALBUMIN SERPL-MCNC: 4.5 G/DL (ref 3.9–4.9)
ALP SERPL-CCNC: 38 IU/L (ref 44–121)
ALT SERPL-CCNC: 11 IU/L (ref 0–32)
AST SERPL-CCNC: 17 IU/L (ref 0–40)
BAKER'S YEAST IGA QN: <20 UNITS (ref 0–24.9)
BAKER'S YEAST IGG QN: 21.5 UNITS (ref 0–24.9)
BASOPHILS # BLD AUTO: 0 X10E3/UL (ref 0–0.2)
BASOPHILS NFR BLD AUTO: 1 %
BILIRUB SERPL-MCNC: 0.7 MG/DL (ref 0–1.2)
BUN SERPL-MCNC: 16 MG/DL (ref 6–24)
BUN/CREAT SERPL: 20 (ref 9–23)
CALCIUM SERPL-MCNC: 9.6 MG/DL (ref 8.7–10.2)
CHLORIDE SERPL-SCNC: 102 MMOL/L (ref 96–106)
CHOLEST SERPL-MCNC: 449 MG/DL (ref 100–199)
CHOLEST/HDLC SERPL: 5.4 RATIO (ref 0–4.4)
CLAM IGE QN: <0.1 KU/L
CO2 SERPL-SCNC: 23 MMOL/L (ref 20–29)
CODFISH IGE QN: <0.1 KU/L
CONV CLASS DESCRIPTION: ABNORMAL
CORN IGE QN: <0.1 KU/L
COW MILK IGE QN: 0.18 KU/L
CREAT SERPL-MCNC: 0.8 MG/DL (ref 0.57–1)
CRP SERPL-MCNC: <1 MG/L (ref 0–10)
EGFRCR SERPLBLD CKD-EPI 2021: 95 ML/MIN/1.73
EGG WHITE IGE QN: 0.24 KU/L
EOSINOPHIL # BLD AUTO: 0.1 X10E3/UL (ref 0–0.4)
EOSINOPHIL NFR BLD AUTO: 2 %
ERYTHROCYTE [DISTWIDTH] IN BLOOD BY AUTOMATED COUNT: 12.3 % (ref 11.7–15.4)
ERYTHROCYTE [SEDIMENTATION RATE] IN BLOOD BY WESTERGREN METHOD: 4 MM/HR (ref 0–32)
GLOBULIN SER CALC-MCNC: 2.8 G/DL (ref 1.5–4.5)
GLUCOSE SERPL-MCNC: 94 MG/DL (ref 70–99)
HBA1C MFR BLD: 5.4 % (ref 4.8–5.6)
HCT VFR BLD AUTO: 41.7 % (ref 34–46.6)
HDLC SERPL-MCNC: 83 MG/DL
HGB BLD-MCNC: 13.6 G/DL (ref 11.1–15.9)
IMM GRANULOCYTES # BLD AUTO: 0 X10E3/UL (ref 0–0.1)
IMM GRANULOCYTES NFR BLD AUTO: 0 %
LDL CALC COMMENT:: ABNORMAL
LDLC SERPL CALC-MCNC: 362 MG/DL (ref 0–99)
LYMPHOCYTES # BLD AUTO: 2.6 X10E3/UL (ref 0.7–3.1)
LYMPHOCYTES NFR BLD AUTO: 36 %
MAGNESIUM SERPL-MCNC: 2.2 MG/DL (ref 1.6–2.3)
MCH RBC QN AUTO: 30.4 PG (ref 26.6–33)
MCHC RBC AUTO-ENTMCNC: 32.6 G/DL (ref 31.5–35.7)
MCV RBC AUTO: 93 FL (ref 79–97)
MONOCYTES # BLD AUTO: 0.6 X10E3/UL (ref 0.1–0.9)
MONOCYTES NFR BLD AUTO: 8 %
NEUTROPHILS # BLD AUTO: 3.9 X10E3/UL (ref 1.4–7)
NEUTROPHILS NFR BLD AUTO: 53 %
P-ANCA ATYPICAL TITR SER IF: NORMAL TITER
PEANUT IGE QN: <0.1 KU/L
PLATELET # BLD AUTO: 254 X10E3/UL (ref 150–450)
POTASSIUM SERPL-SCNC: 4.3 MMOL/L (ref 3.5–5.2)
PROT SERPL-MCNC: 7.3 G/DL (ref 6–8.5)
RBC # BLD AUTO: 4.48 X10E6/UL (ref 3.77–5.28)
SCALLOP IGE QN: <0.1 KU/L
SESAME SEED IGE QN: <0.1 KU/L
SHRIMP IGE QN: <0.1 KU/L
SODIUM SERPL-SCNC: 138 MMOL/L (ref 134–144)
SOYBEAN IGE QN: <0.1 KU/L
TRIGL SERPL-MCNC: 58 MG/DL (ref 0–149)
TSH SERPL DL<=0.005 MIU/L-ACNC: 1.99 UIU/ML (ref 0.45–4.5)
VISC SER: 1.6 REL.SALINE (ref 1.4–2)
VLDLC SERPL CALC-MCNC: 4 MG/DL (ref 5–40)
WALNUT IGE QN: <0.1 KU/L
WBC # BLD AUTO: 7.3 X10E3/UL (ref 3.4–10.8)
WHEAT IGE QN: <0.1 KU/L
ZINC SERPL-MCNC: 68 UG/DL (ref 44–115)

## 2025-08-19 ENCOUNTER — OFFICE (AMBULATORY)
Dept: URBAN - METROPOLITAN AREA PATHOLOGY 19 | Facility: PATHOLOGY | Age: 40
End: 2025-08-19

## 2025-08-19 ENCOUNTER — ON CAMPUS - OUTPATIENT (AMBULATORY)
Dept: URBAN - METROPOLITAN AREA HOSPITAL 2 | Facility: HOSPITAL | Age: 40
End: 2025-08-19
Payer: COMMERCIAL

## 2025-08-19 VITALS
DIASTOLIC BLOOD PRESSURE: 80 MMHG | HEIGHT: 60 IN | DIASTOLIC BLOOD PRESSURE: 65 MMHG | RESPIRATION RATE: 18 BRPM | RESPIRATION RATE: 17 BRPM | HEART RATE: 84 BPM | SYSTOLIC BLOOD PRESSURE: 106 MMHG | RESPIRATION RATE: 19 BRPM | HEART RATE: 76 BPM | SYSTOLIC BLOOD PRESSURE: 112 MMHG | SYSTOLIC BLOOD PRESSURE: 129 MMHG | DIASTOLIC BLOOD PRESSURE: 64 MMHG | SYSTOLIC BLOOD PRESSURE: 111 MMHG | HEART RATE: 89 BPM | DIASTOLIC BLOOD PRESSURE: 68 MMHG | HEART RATE: 70 BPM | SYSTOLIC BLOOD PRESSURE: 98 MMHG | RESPIRATION RATE: 16 BRPM | SYSTOLIC BLOOD PRESSURE: 99 MMHG | RESPIRATION RATE: 14 BRPM | SYSTOLIC BLOOD PRESSURE: 120 MMHG | HEART RATE: 79 BPM | WEIGHT: 114.2 LBS | HEART RATE: 59 BPM | RESPIRATION RATE: 15 BRPM | DIASTOLIC BLOOD PRESSURE: 81 MMHG | TEMPERATURE: 97 F | DIASTOLIC BLOOD PRESSURE: 96 MMHG | HEART RATE: 78 BPM | OXYGEN SATURATION: 100 % | DIASTOLIC BLOOD PRESSURE: 73 MMHG | HEART RATE: 66 BPM | SYSTOLIC BLOOD PRESSURE: 128 MMHG | DIASTOLIC BLOOD PRESSURE: 69 MMHG | HEART RATE: 80 BPM

## 2025-08-19 DIAGNOSIS — K63.89 OTHER SPECIFIED DISEASES OF INTESTINE: ICD-10-CM

## 2025-08-19 DIAGNOSIS — K62.89 OTHER SPECIFIED DISEASES OF ANUS AND RECTUM: ICD-10-CM

## 2025-08-19 DIAGNOSIS — K51.90 ULCERATIVE COLITIS, UNSPECIFIED, WITHOUT COMPLICATIONS: ICD-10-CM

## 2025-08-19 DIAGNOSIS — D12.3 BENIGN NEOPLASM OF TRANSVERSE COLON: ICD-10-CM

## 2025-08-19 PROBLEM — K63.5 POLYP OF COLON: Status: ACTIVE | Noted: 2025-08-19

## 2025-08-19 LAB
GI HISTOLOGY: A. RIGHT COLON: (no result)
GI HISTOLOGY: B. TRANSVERSE COLON: (no result)
GI HISTOLOGY: C. LEFT COLON: (no result)
GI HISTOLOGY: D. TRANSVERSE COLON: (no result)
GI HISTOLOGY: E. RECTUM: (no result)
GI HISTOLOGY: PDF REPORT: (no result)

## 2025-08-19 PROCEDURE — 45385 COLONOSCOPY W/LESION REMOVAL: CPT | Mod: 33 | Performed by: INTERNAL MEDICINE

## 2025-08-19 PROCEDURE — 88305 TISSUE EXAM BY PATHOLOGIST: CPT | Performed by: PATHOLOGY

## 2025-08-19 PROCEDURE — 45380 COLONOSCOPY AND BIOPSY: CPT | Mod: 33,59 | Performed by: INTERNAL MEDICINE
